# Patient Record
Sex: FEMALE | Race: BLACK OR AFRICAN AMERICAN | NOT HISPANIC OR LATINO | Employment: OTHER | URBAN - METROPOLITAN AREA
[De-identification: names, ages, dates, MRNs, and addresses within clinical notes are randomized per-mention and may not be internally consistent; named-entity substitution may affect disease eponyms.]

---

## 2017-04-05 ENCOUNTER — OFFICE VISIT (OUTPATIENT)
Dept: PODIATRY | Facility: CLINIC | Age: 72
End: 2017-04-05
Payer: MEDICARE

## 2017-04-05 VITALS
WEIGHT: 240 LBS | HEIGHT: 67 IN | DIASTOLIC BLOOD PRESSURE: 60 MMHG | BODY MASS INDEX: 37.67 KG/M2 | SYSTOLIC BLOOD PRESSURE: 110 MMHG

## 2017-04-05 DIAGNOSIS — B35.1 ONYCHOMYCOSIS DUE TO DERMATOPHYTE: ICD-10-CM

## 2017-04-05 DIAGNOSIS — L84 CORN OR CALLUS: ICD-10-CM

## 2017-04-05 DIAGNOSIS — E11.49 TYPE II DIABETES MELLITUS WITH NEUROLOGICAL MANIFESTATIONS: Primary | ICD-10-CM

## 2017-04-05 DIAGNOSIS — M20.10 HALLUX ABDUCTO VALGUS, UNSPECIFIED LATERALITY: ICD-10-CM

## 2017-04-05 PROCEDURE — 11721 DEBRIDE NAIL 6 OR MORE: CPT | Mod: Q9,PBBFAC,PO | Performed by: PODIATRIST

## 2017-04-05 PROCEDURE — 11721 DEBRIDE NAIL 6 OR MORE: CPT | Mod: 59,Q9,S$PBB, | Performed by: PODIATRIST

## 2017-04-05 PROCEDURE — 11056 PARNG/CUTG B9 HYPRKR LES 2-4: CPT | Mod: Q9,PBBFAC,PO | Performed by: PODIATRIST

## 2017-04-05 PROCEDURE — 99212 OFFICE O/P EST SF 10 MIN: CPT | Mod: PBBFAC,PO | Performed by: PODIATRIST

## 2017-04-05 PROCEDURE — 11056 PARNG/CUTG B9 HYPRKR LES 2-4: CPT | Mod: Q9,S$PBB,, | Performed by: PODIATRIST

## 2017-04-05 PROCEDURE — 99499 UNLISTED E&M SERVICE: CPT | Mod: S$PBB,,, | Performed by: PODIATRIST

## 2017-04-05 PROCEDURE — 99999 PR PBB SHADOW E&M-EST. PATIENT-LVL II: CPT | Mod: PBBFAC,,, | Performed by: PODIATRIST

## 2017-04-05 RX ORDER — ERGOCALCIFEROL 1.25 MG/1
50000 CAPSULE ORAL
Refills: 0 | COMMUNITY
Start: 2017-03-21

## 2017-04-05 NOTE — MR AVS SNAPSHOT
[facility]  [address] [facility]  [place] [address]  Phone: [phone]                  Evangelina Mallory   2017 3:15 PM   Office Visit    Description:  Female : 1945   Provider:  Virginia Goins DPM   Department:  [facility]           Reason for Visit     Diabetes Mellitus     Diabetic Foot Exam     Nail Care                To Do List           Future Appointments        Provider Department Dept Phone    2017 11:00 AM Virginia Goins DPM [facility] [phone]      Goals (5 Years of Data)     None      [facility]     [facility] Care [facility] -  Assistance  Unless otherwise directed by your provider, please contact [facility], our nurse care line that is available for  assistance.     Registered nurses in the [facility] provide: appointment scheduling, clinical advisement, health education, and other advisory services.  Call: [phone] (toll free)               Medications           Message regarding Medications     Verify the changes and/or additions to your medication regime listed below are the same as discussed with your clinician today.  If any of these changes or additions are incorrect, please notify your healthcare provider.        STOP taking these medications     ciclopirox (PENLAC) 8 % Soln Apply to affected toenails once per day x 3 months. Once per week use alcohol to remove excess product    ciprofloxacin HCl (CILOXAN) 0.3 % ophthalmic solution     doxycycline (VIBRA-TABS) 100 MG tablet     glyBURIDE (DIABETA) 5 MG tablet     ketorolac 0.4% (ACULAR) 0.4 % Drop     methylPREDNISolone (MEDROL DOSEPACK) 4 mg tablet     polyethylene glycol (GOLYTELY,NULYTELY) 236-22.74-6.74 gram suspension     prednisoLONE acetate (PRED FORTE) 1 % DrpS            Verify that the below list of medications is an accurate representation of the medications you are currently taking.  If none reported, the list may be blank. If incorrect,  "please contact your healthcare provider. Carry this list with you in case of emergency.           Current Medications     amitriptyline (ELAVIL) 25 MG tablet TK 1 T PO  AT BEDTIME    amlodipine (NORVASC) 5 MG tablet     CONTOUR NEXT STRIPS Strp     cyclobenzaprine (FLEXERIL) 5 MG tablet     ferrous sulfate 325 mg (65 mg iron) Tab tablet TK 1 T PO  BID    FLUZONE HIGH-DOSE 2016-17, PF, 180 mcg/0.5 mL Syrg ADM 0.5ML IM UTD    gabapentin (NEURONTIN) 800 MG tablet     glipiZIDE (GLUCOTROL) 5 MG tablet     LANTUS SOLOSTAR 100 unit/mL (3 mL) InPn pen     losartan (COZAAR) 25 MG tablet     LYRICA 75 mg capsule     meclizine (ANTIVERT) 25 mg tablet     MICROLET LANCET Misc     NOVOFINE 30 30 x 1/3 " Ndle     pantoprazole (PROTONIX) 40 MG tablet     pravastatin (PRAVACHOL) 40 MG tablet     PROAIR HFA 90 mcg/actuation inhaler     tramadol (ULTRAM) 50 mg tablet     VITAMIN D2 50,000 unit capsule Take 50,000 Units by mouth every 7 days.           Clinical Reference Information           Your Vitals Were     BP Height Weight BMI       110/60 5' 7" (1.702 m) 108.9 kg (240 lb) 37.59 kg/m2       Blood Pressure          Most Recent Value    BP  110/60      Allergies as of 4/5/2017     No Known Allergies      Immunizations Administered on Date of Encounter - 4/5/2017     None      [facility] Sign-Up     Activating your [facility] account is as easy as 1-2-3!     1) Visit [email or web address], select Sign Up Now, enter this activation code and your date of birth, then select Next.  [email or web address]  Expires: 5/20/2017  3:51 PM      2) Create a username and password to use when you visit [facility] in the future and select a security question in case you lose your password and select Next.    3) Enter your e-mail address and click Sign Up!    Additional Information  If you have questions, please e-mail [email or web address] or call [phone] to talk to our [facility] staff. Remember, [facility] is NOT to be used for urgent needs. For medical " emergencies, dial 911.         Language Assistance Services     ATTENTION: Language assistance services are available, free of charge. Please call [phone].      ATENCIÓN: Si habla terese, tiene a hilton disposición servicios gratuitos de asistencia lingüística. Llame al [phone].     CHÚ Ý: N?u b?n nói Ti?ng Vi?t, có các d?ch v? h? tr? ngôn ng? mi?n phí dành cho b?n. G?i s? [phone].         [facility] complies with applicable Federal civil rights laws and does not discriminate on the basis of race, color, national origin, age, disability, or sex.

## 2017-04-05 NOTE — PROGRESS NOTES
"Subjective:      Patient ID: Evangelina Mallory is a 71 y.o. female.    Chief Complaint: Diabetes Mellitus (pcp Dr. Lilly ); Diabetic Foot Exam; and Nail Care    Evangelina is a 71 y.o. female who presents to the clinic for evaluation and treatment of high risk feet. Evangelina has a past medical history of Diabetes mellitus, type 2. The patient's chief complaint is elongated, thickened toenails aggravated by increased weight bearing, shoe gear, pressure. This patient has documented high risk feet requiring routine maintenance secondary to diabetes mellitis and those secondary complications of diabetes, as mentioned.       PCP: Ashlee Lilly MD    Date Last Seen by PCP:   Chief Complaint   Patient presents with    Diabetes Mellitus     pcp Dr. Lilly     Diabetic Foot Exam    Nail Care     Current shoe gear: boat shoes    No results found for: HGBA1C  There are no active problems to display for this patient.    Current Outpatient Prescriptions on File Prior to Visit   Medication Sig Dispense Refill    amitriptyline (ELAVIL) 25 MG tablet TK 1 T PO  AT BEDTIME  1    amlodipine (NORVASC) 5 MG tablet   1    CONTOUR NEXT STRIPS Strp   1    cyclobenzaprine (FLEXERIL) 5 MG tablet       ferrous sulfate 325 mg (65 mg iron) Tab tablet TK 1 T PO  BID  3    FLUZONE HIGH-DOSE 2016-17, PF, 180 mcg/0.5 mL Syrg ADM 0.5ML IM UTD  0    gabapentin (NEURONTIN) 800 MG tablet       glipiZIDE (GLUCOTROL) 5 MG tablet       LANTUS SOLOSTAR 100 unit/mL (3 mL) InPn pen       losartan (COZAAR) 25 MG tablet   1    LYRICA 75 mg capsule       meclizine (ANTIVERT) 25 mg tablet   0    MICROLET LANCET Misc   1    NOVOFINE 30 30 x 1/3 " Ndle       pantoprazole (PROTONIX) 40 MG tablet       pravastatin (PRAVACHOL) 40 MG tablet       PROAIR HFA 90 mcg/actuation inhaler       tramadol (ULTRAM) 50 mg tablet       [DISCONTINUED] amlodipine (NORVASC) 10 MG tablet       [DISCONTINUED] ciclopirox (PENLAC) 8 % Soln Apply to affected toenails once per " day x 3 months. Once per week use alcohol to remove excess product 1 Bottle 3    [DISCONTINUED] ciprofloxacin HCl (CILOXAN) 0.3 % ophthalmic solution       [DISCONTINUED] doxycycline (VIBRA-TABS) 100 MG tablet       [DISCONTINUED] glyBURIDE (DIABETA) 5 MG tablet       [DISCONTINUED] ketorolac 0.4% (ACULAR) 0.4 % Drop       [DISCONTINUED] methylPREDNISolone (MEDROL DOSEPACK) 4 mg tablet       [DISCONTINUED] polyethylene glycol (GOLYTELY,NULYTELY) 236-22.74-6.74 gram suspension       [DISCONTINUED] prednisoLONE acetate (PRED FORTE) 1 % DrpS        No current facility-administered medications on file prior to visit.      Review of patient's allergies indicates:  No Known Allergies  Past Surgical History:   Procedure Laterality Date    HYSTERECTOMY       Family History   Problem Relation Age of Onset    Hypertension Mother     Diabetes Father      Social History     Social History    Marital status:      Spouse name: N/A    Number of children: N/A    Years of education: N/A     Occupational History    Not on file.     Social History Main Topics    Smoking status: Former Smoker    Smokeless tobacco: Not on file    Alcohol use Yes    Drug use: No    Sexual activity: Not on file     Other Topics Concern    Not on file     Social History Narrative     Review of Systems   Constitution: Negative for chills, fever and weakness.   Cardiovascular: Negative for claudication and leg swelling.   Respiratory: Negative for cough and shortness of breath.    Skin: Positive for dry skin and nail changes. Negative for itching and rash.   Musculoskeletal: Positive for arthritis and muscle cramps (nocturnal). Negative for falls, joint pain, joint swelling and muscle weakness.   Gastrointestinal: Negative for diarrhea, nausea and vomiting.   Neurological: Positive for numbness, paresthesias and sensory change. Negative for tremors.   Psychiatric/Behavioral: Negative for altered mental status and hallucinations.  "        Objective:       Vitals:    04/05/17 1526   BP: 110/60   Weight: 108.9 kg (240 lb)   Height: 5' 7" (1.702 m)   PainSc: 0-No pain       Physical Exam   Constitutional:   General: Pt. is well-developed, well-nourished, appears stated age, in no acute distress, alert and oriented x 3. No evidence of depression, anxiety, or agitation. Calm, cooperative, and communicative. Appropriate interactions and affect.       Cardiovascular:   Pulses:       Dorsalis pedis pulses are 1+ on the right side, and 1+ on the left side.        Posterior tibial pulses are 1+ on the right side, and 1+ on the left side.   Dorsalis pedis and posterior tibial pulses are diminished Bilaterally. Toes are cool to touch. Feet are warm proximally.There is decreased digital hair. Skin is atrophic     Musculoskeletal:        Right ankle: She exhibits normal range of motion and no swelling. No tenderness. Achilles tendon exhibits no pain and no defect.        Left ankle: She exhibits normal range of motion and no swelling. No tenderness. Achilles tendon exhibits no pain and no defect.        Right foot: There is normal range of motion and no tenderness.        Left foot: There is normal range of motion and no tenderness.   Decreased stride, station of gait.  apropulsive toe off.  Increased angle and base of gait.    Patient has hammertoes of digits 2-5 bilateral partially reducible without symptom today.    Visible and palpable bunion without pain at dorsomedial 1st metatarsal head right and left.  Hallux abducted right and left partially reducible, tracks laterally without being track bound.  No ecchymosis, erythema, edema, or cardinal signs infection or signs of trauma same foot.     Neurological: A sensory deficit is present.   Sacaton-Sonu 5.07 monofilamant testing is diminished Yadiel feet. Sharp/dull sensation diminished Bilaterally. Light touch diminished Bilaterally.   Skin: Skin is warm, dry and intact. No abrasion, no ecchymosis, no " lesion and no rash noted. She is not diaphoretic. No cyanosis or erythema. No pallor. Nails show no clubbing.   Skin is atrophic.      Diminished pedal hair noted    Toenails 1-5 bilaterally are elongated by 2-3 mm, thickened by 2-3 mm, discolored/yellowed, dystrophic, brittle with subungual debris.      Interdigital Spaces clean, dry and without evidence of break in skin integrity    Hyperkeratosis noted to distal tip of b/l 2nd digit adjacent to nail borders.    Psychiatric: She has a normal mood and affect. Her speech is normal.   Nursing note and vitals reviewed.        Assessment:       Encounter Diagnoses   Name Primary?    Type II diabetes mellitus with neurological manifestations Yes    Onychomycosis due to dermatophyte     Corn or callus     Hallux abducto valgus, unspecified laterality          Plan:       Evangelina was seen today for diabetes mellitus, diabetic foot exam and nail care.    Diagnoses and all orders for this visit:    Type II diabetes mellitus with neurological manifestations    Onychomycosis due to dermatophyte    Corn or callus    Hallux abducto valgus, unspecified laterality    I counseled the patient on her conditions, their implications and medical management.    - Shoe inspection. Diabetic Foot Education. Patient reminded of the importance of good nutrition and blood sugar control to help prevent podiatric complications of diabetes. Patient instructed on proper foot hygeine. We discussed wearing proper shoe gear, daily foot inspections, never walking without protective shoe gear, never putting sharp instruments to feet.     - With patient's permission, nails were aggressively reduced and debrided x 10 to their soft tissue attachment mechanically and with electric , removing all offending nail and debris. Patient relates relief following the procedure. She will continue to monitor the areas daily, inspect her feet, wear protective shoe gear when ambulatory, moisturizer to  maintain skin integrity and follow in this office in approximately 3-4 months, sooner p.r.n.    - b/l distal tip of 2nd digit corns/calluses trimmed with sterile #15 blade. Tolerated well and related relief following.     RTC 3 months, sooner PRN

## 2017-07-05 ENCOUNTER — OFFICE VISIT (OUTPATIENT)
Dept: PODIATRY | Facility: CLINIC | Age: 72
End: 2017-07-05
Payer: MEDICARE

## 2017-07-05 VITALS
BODY MASS INDEX: 37.67 KG/M2 | SYSTOLIC BLOOD PRESSURE: 120 MMHG | HEIGHT: 67 IN | DIASTOLIC BLOOD PRESSURE: 60 MMHG | WEIGHT: 240 LBS

## 2017-07-05 DIAGNOSIS — E11.49 TYPE II DIABETES MELLITUS WITH NEUROLOGICAL MANIFESTATIONS: Primary | ICD-10-CM

## 2017-07-05 DIAGNOSIS — M20.10 HALLUX ABDUCTO VALGUS, UNSPECIFIED LATERALITY: ICD-10-CM

## 2017-07-05 DIAGNOSIS — B35.1 ONYCHOMYCOSIS DUE TO DERMATOPHYTE: ICD-10-CM

## 2017-07-05 DIAGNOSIS — R20.2 PARESTHESIA: ICD-10-CM

## 2017-07-05 PROCEDURE — 11721 DEBRIDE NAIL 6 OR MORE: CPT | Mod: Q9,PBBFAC,PO | Performed by: PODIATRIST

## 2017-07-05 PROCEDURE — 99499 UNLISTED E&M SERVICE: CPT | Mod: S$PBB,,, | Performed by: PODIATRIST

## 2017-07-05 PROCEDURE — 11721 DEBRIDE NAIL 6 OR MORE: CPT | Mod: Q9,S$PBB,, | Performed by: PODIATRIST

## 2017-07-05 PROCEDURE — 99213 OFFICE O/P EST LOW 20 MIN: CPT | Mod: PBBFAC,PO | Performed by: PODIATRIST

## 2017-07-05 PROCEDURE — 99999 PR PBB SHADOW E&M-EST. PATIENT-LVL III: CPT | Mod: PBBFAC,,, | Performed by: PODIATRIST

## 2017-07-05 RX ORDER — INSULIN ASPART 100 [IU]/ML
10 INJECTION, SOLUTION INTRAVENOUS; SUBCUTANEOUS 3 TIMES DAILY
COMMUNITY
Start: 2017-04-18

## 2017-07-05 NOTE — PATIENT INSTRUCTIONS
Recommend lotions: eucerin, aquaphor, A&D ointment, gold bond for diabetics, sween    Shoe recommendations: (try 6pm.com, zappos.com , nordstromrack.com, or shoes.com for discounted prices) you can visit DSW shoes in [place] as well    Asics (GT 1000 or gel foundations), new balance, saucony (stabil c3),  Atkinson (transcend), vionic, propet (tennis shoe)    soft brand, clarks, crocs, aerosoles, naturalizers, SAS, ecco, marie, orlando castro, rockports (dress shoes)    Vionic, volitiles, burkenstocks, fitflops, propet (sandals)    Nike comfort thong sandals, crocs (house shoes)    Nail Home remedy:  Vicks Vapor rub OR Listerine and apple cider vinegar in a spray bottle to nails    Occasional soaks for 15-20 mins in luke warm water with 1 cup of listerine and 1 cup of apple cider vinegar are ok You may add several drops of oil of oregano or tea tree oil as well      Diabetes: Inspecting Your Feet  Diabetes increases your chances of developing foot problems. So inspect your feet every day. This helps you find small skin irritations before they become serious infections. If you have trouble seeing the bottoms of your feet, use a mirror or ask a family member or friend to help.     Pressure spots on the bottom of the foot are common areas where problems develop.   How to check your feet  Below are tips to help you look for foot problems. Try to check your feet at the same time each day, such as when you get out of bed in the morning:  · Check the top of each foot. The tops of toes, back of the heel, and outer edge of the foot can get a lot of rubbing from poor-fitting shoes.  · Check the bottom of each foot. Daily wear and tear often leads to problems at pressure spots.  · Check the toes and nails. Fungal infections often occur between toes. Toenail problems can also be a sign of fungal infections or lead to breaks in the skin.  · Check your shoes, too. Loose objects inside a shoe can injure the foot. Use your hand to feel  "inside your shoes for things like isabela, loose stitching, or rough areas that could irritate your skin.  Warning signs  Look for any color changes in the foot. Redness with streaks can signal a severe infection, which needs immediate medical attention. Tell your doctor right away if you have any of these problems:  · Swelling, sometimes with color changes, may be a sign of poor blood flow or infection. Symptoms include tenderness and an increase in the size of your foot.  · Warm or hot areas on your feet may be signs of infection. A foot that is cold may not be getting enough blood.  · Sensations such as burning, tingling, or pins and needles can be signs of a problem. Also check for areas that may be numb.  · Hot spots are caused by friction or pressure. Look for hot spots in areas that get a lot of rubbing. Hot spots can turn into blisters, calluses, or sores.  · Cracks and sores are caused by dry or irritated skin. They are a sign that the skin is breaking down, which can lead to infection.  · Toenail problems to watch for include nails growing into the skin (ingrown toenail) and causing redness or pain. Thick, yellow, or discolored nails can signal a fungal infection.  · Drainage and odor can develop from untreated sores and ulcers. Call your doctor right away if you notice white or yellow drainage, bleeding, or unpleasant odor.   © 8889-3161 [employer]. [address] [place] [id number]. All rights reserved. This information is not intended as a substitute for professional medical care. Always follow your healthcare professional's instructions.      \  Paraesthesias  Paraesthesia is a burning or prickling sensation that is sometimes felt in the hands, arms, legs or feet. It can also occur in other parts of the body. It can also feel like tingling or numbness, skin crawling, or itching. The feeling is not comfortable, but it is not painful. (The "pins and needles" feeling that happens " "when a foot or hand "falls asleep" is a temporary paraesthesia.)  Paraesthesias that last or come and go may be caused by medical issues that need to be treated. These include stroke, a bulging disk pressing on a nerve, a trapped nerve, vitamin deficiencies, or even certain medicines.  Tests are often done. These tests may include blood tests, X-ray, CT (computerized tomography) scan, or a muscle test (electromyography). Depending on the cause, treatment may include physical therapy.  Home care  · Tell the healthcare provider about all medicines you take. This includes prescription and over-the-counter medicines, vitamins, and herbs. Ask if any of the medicines may be causing your problems. Do not make any changes to prescription medicines without talking to your healthcare provider first.  · You may be prescribed medicines to help relieve the tingling feeling or for pain. Take all medicines as directed.  · A numb hand or foot may be more prone to injury. To help protect it:  ¨ Always use oven mitts.  ¨ Test water with an unaffected hand or foot.  ¨ Use caution when trimming nails. File sharp areas.  ¨ Wear shoes that fit well to avoid pressure points, blisters, and ulcers.  ¨ Inspect your hands and feet carefully (including the soles of your feet and between your toes) at least once a week. If you see red areas, sores, or other problems, tell your healthcare provider.  Follow-up care  Follow up with your doctor or as advised by our staff. You may need further testing or evaluation.  When to seek medical advice  Call your healthcare provider right away if any of the following occur:  · Numbness or weakness of the face, one arm, or one leg  · Slurred speech, confusion, trouble speaking, walking, or seeing  · Severe headache, fainting spell, dizziness, or seizure  · Chest, arm, neck, or upper back pain  · Loss of bladder or bowel control  · Open wound with redness, swelling, or pus  Date Last Reviewed: 9/25/2015  © " 3683-8595 [employer] [address] [place] [address], [place] [address]. All rights reserved. This information is not intended as a substitute for professional medical care. Always follow your healthcare professional's instructions.

## 2017-07-05 NOTE — PROGRESS NOTES
"Subjective:      Patient ID: Evangelina Mallory is a 71 y.o. female.    Chief Complaint: Diabetes Mellitus (pcp boy / lov 6-27-17, [facility] ); Diabetic Foot Exam; and Nail Care    Evangelina is a 71 y.o. female who presents to the clinic for evaluation and treatment of high risk feet. Evangelina has a past medical history of Diabetes mellitus, type 2. The patient's chief complaint is elongated, thickened toenails aggravated by increased weight bearing, shoe gear, pressure. This patient has documented high risk feet requiring routine maintenance secondary to diabetes mellitis and those secondary complications of diabetes, as mentioned.       PCP: Ashlee Lilly MD    Date Last Seen by PCP:   Chief Complaint   Patient presents with    Diabetes Mellitus     pcp boy / lov 6-27-17, [facility]     Diabetic Foot Exam    Nail Care     Current shoe gear: boat shoes    No results found for: HGBA1C  There are no active problems to display for this patient.    Current Outpatient Prescriptions on File Prior to Visit   Medication Sig Dispense Refill    amlodipine (NORVASC) 5 MG tablet   1    CONTOUR NEXT STRIPS Strp   1    cyclobenzaprine (FLEXERIL) 5 MG tablet       ferrous sulfate 325 mg (65 mg iron) Tab tablet TK 1 T PO  BID  3    gabapentin (NEURONTIN) 800 MG tablet       glipiZIDE (GLUCOTROL) 5 MG tablet       LANTUS SOLOSTAR 100 unit/mL (3 mL) InPn pen       losartan (COZAAR) 25 MG tablet   1    LYRICA 75 mg capsule       meclizine (ANTIVERT) 25 mg tablet   0    MICROLET LANCET Misc   1    NOVOFINE 30 30 x 1/3 " Ndle       pantoprazole (PROTONIX) 40 MG tablet       pravastatin (PRAVACHOL) 40 MG tablet       PROAIR HFA 90 mcg/actuation inhaler       tramadol (ULTRAM) 50 mg tablet       VITAMIN D2 50,000 unit capsule Take 50,000 Units by mouth every 7 days.  0     No current facility-administered medications on file prior to visit.      Review of patient's allergies indicates:  No Known " "Allergies  Past Surgical History:   Procedure Laterality Date    HYSTERECTOMY       Family History   Problem Relation Age of Onset    Hypertension Mother     Diabetes Father      Social History     Social History    Marital status:      Spouse name: N/A    Number of children: N/A    Years of education: N/A     Occupational History    Not on file.     Social History Main Topics    Smoking status: Former Smoker    Smokeless tobacco: Not on file    Alcohol use Yes    Drug use: No    Sexual activity: Not on file     Other Topics Concern    Not on file     Social History Narrative    No narrative on file     Review of Systems   Constitution: Positive for weight loss (intentional, 5 lbs). Negative for chills, fever and weakness.   Cardiovascular: Negative for claudication and leg swelling.   Respiratory: Negative for cough and shortness of breath.    Skin: Positive for dry skin and nail changes. Negative for itching and rash.   Musculoskeletal: Positive for arthritis and muscle cramps (nocturnal). Negative for falls, joint pain, joint swelling and muscle weakness.   Gastrointestinal: Negative for diarrhea, nausea and vomiting.   Neurological: Positive for numbness, paresthesias and sensory change. Negative for tremors.   Psychiatric/Behavioral: Negative for altered mental status and hallucinations.         Objective:       Vitals:    07/05/17 1127   BP: 120/60   Weight: 108.9 kg (240 lb)   Height: 5' 7" (1.702 m)   PainSc: 0-No pain       Physical Exam   Constitutional:   General: Pt. is well-developed, well-nourished, appears stated age, in no acute distress, alert and oriented x 3. No evidence of depression, anxiety, or agitation. Calm, cooperative, and communicative. Appropriate interactions and affect.       Cardiovascular:   Pulses:       Dorsalis pedis pulses are 1+ on the right side, and 1+ on the left side.        Posterior tibial pulses are 1+ on the right side, and 1+ on the left side. "   Dorsalis pedis and posterior tibial pulses are diminished Bilaterally. Toes are cool to touch. Feet are warm proximally.There is decreased digital hair. Skin is atrophic     Musculoskeletal:        Right ankle: She exhibits normal range of motion and no swelling. No tenderness. Achilles tendon exhibits no pain and no defect.        Left ankle: She exhibits normal range of motion and no swelling. No tenderness. Achilles tendon exhibits no pain and no defect.        Right foot: There is normal range of motion and no tenderness.        Left foot: There is normal range of motion and no tenderness.   Decreased stride, station of gait.  apropulsive toe off.  Increased angle and base of gait.    Patient has hammertoes of digits 2-5 bilateral partially reducible without symptom today.    Visible and palpable bunion without pain at dorsomedial 1st metatarsal head right and left.  Hallux abducted right and left partially reducible, tracks laterally without being track bound.  No ecchymosis, erythema, edema, or cardinal signs infection or signs of trauma same foot.     Neurological: A sensory deficit is present.   [place]-Sonu 5.07 monofilamant testing is diminished Yadiel feet. Sharp/dull sensation diminished Bilaterally. Light touch diminished Bilaterally.   Skin: Skin is warm, dry and intact. No abrasion, no ecchymosis, no lesion and no rash noted. She is not diaphoretic. No cyanosis or erythema. No pallor. Nails show no clubbing.   Skin is atrophic.      Diminished pedal hair noted    Toenails 1-5 bilaterally are elongated by 2-3 mm, thickened by 2-3 mm, discolored/yellowed, dystrophic, brittle with subungual debris.      Interdigital Spaces clean, dry and without evidence of break in skin integrity     Psychiatric: She has a normal mood and affect. Her speech is normal.   Nursing note and vitals reviewed.        Assessment:       Encounter Diagnoses   Name Primary?    Type II diabetes mellitus with neurological  manifestations Yes    Onychomycosis due to dermatophyte     Hallux abducto valgus, unspecified laterality     Paresthesia          Plan:       Evangelina was seen today for diabetes mellitus, diabetic foot exam and nail care.    Diagnoses and all orders for this visit:    Type II diabetes mellitus with neurological manifestations    Onychomycosis due to dermatophyte    Hallux abducto valgus, unspecified laterality    Paresthesia      I counseled the patient on her conditions, their implications and medical management.    - Shoe inspection. Diabetic Foot Education. Patient reminded of the importance of good nutrition and blood sugar control to help prevent podiatric complications of diabetes. Patient instructed on proper foot hygeine. We discussed wearing proper shoe gear, daily foot inspections, never walking without protective shoe gear, never putting sharp instruments to feet.     - With patient's permission, nails were aggressively reduced and debrided x 10 to their soft tissue attachment mechanically and with electric , removing all offending nail and debris. Patient relates relief following the procedure. She will continue to monitor the areas daily, inspect her feet, wear protective shoe gear when ambulatory, moisturizer to maintain skin integrity and follow in this office in approximately 3-4 months, sooner p.r.n.

## 2017-10-11 ENCOUNTER — OFFICE VISIT (OUTPATIENT)
Dept: PODIATRY | Facility: CLINIC | Age: 72
End: 2017-10-11
Payer: MEDICARE

## 2017-10-11 VITALS
BODY MASS INDEX: 37.67 KG/M2 | SYSTOLIC BLOOD PRESSURE: 114 MMHG | HEIGHT: 67 IN | WEIGHT: 240 LBS | DIASTOLIC BLOOD PRESSURE: 50 MMHG

## 2017-10-11 DIAGNOSIS — M20.10 HALLUX ABDUCTO VALGUS, UNSPECIFIED LATERALITY: ICD-10-CM

## 2017-10-11 DIAGNOSIS — E11.49 TYPE II DIABETES MELLITUS WITH NEUROLOGICAL MANIFESTATIONS: Primary | ICD-10-CM

## 2017-10-11 DIAGNOSIS — B35.1 ONYCHOMYCOSIS DUE TO DERMATOPHYTE: ICD-10-CM

## 2017-10-11 PROCEDURE — 99499 UNLISTED E&M SERVICE: CPT | Mod: S$PBB,,, | Performed by: PODIATRIST

## 2017-10-11 PROCEDURE — 99999 PR PBB SHADOW E&M-EST. PATIENT-LVL III: CPT | Mod: PBBFAC,,, | Performed by: PODIATRIST

## 2017-10-11 PROCEDURE — 99213 OFFICE O/P EST LOW 20 MIN: CPT | Mod: PBBFAC,PO | Performed by: PODIATRIST

## 2017-10-11 PROCEDURE — 11721 DEBRIDE NAIL 6 OR MORE: CPT | Mod: Q9,S$PBB,, | Performed by: PODIATRIST

## 2017-10-11 PROCEDURE — 11721 DEBRIDE NAIL 6 OR MORE: CPT | Mod: Q9,PBBFAC,PO | Performed by: PODIATRIST

## 2017-10-11 RX ORDER — PEN NEEDLE, DIABETIC 29 G X1/2"
NEEDLE, DISPOSABLE MISCELLANEOUS
COMMUNITY
Start: 2017-10-09

## 2017-10-11 RX ORDER — PNEUMOCOCCAL 13-VALENT CONJUGATE VACCINE 2.2; 2.2; 2.2; 2.2; 2.2; 4.4; 2.2; 2.2; 2.2; 2.2; 2.2; 2.2; 2.2 UG/.5ML; UG/.5ML; UG/.5ML; UG/.5ML; UG/.5ML; UG/.5ML; UG/.5ML; UG/.5ML; UG/.5ML; UG/.5ML; UG/.5ML; UG/.5ML; UG/.5ML
INJECTION, SUSPENSION INTRAMUSCULAR
Refills: 0 | COMMUNITY
Start: 2017-09-27

## 2017-10-11 RX ORDER — INSULIN DETEMIR 100 [IU]/ML
33 INJECTION, SOLUTION SUBCUTANEOUS NIGHTLY
COMMUNITY
Start: 2017-09-06

## 2017-10-11 RX ORDER — INSULIN ASPART 100 [IU]/ML
INJECTION, SOLUTION INTRAVENOUS; SUBCUTANEOUS
COMMUNITY
Start: 2017-10-05 | End: 2018-08-02 | Stop reason: ALTCHOICE

## 2017-10-11 NOTE — PATIENT INSTRUCTIONS
Recommend lotions: eucerin, aquaphor, A&D ointment, gold bond for diabetics, sween; urea 40 with aloe (found on amazon.com)    Shoe recommendations: (try 6pm.com, zappos.com , nordstromrack.com, or shoes.com for discounted prices) you can visit DSW shoes in [place] as well    Asics (GT 2000 or gel foundations), new balance, saucony (stabil c3),  Atkinson (GTS or Beast or transcend), vionic, propet (tennis shoe)    sofft brand, clarks, crocs, aerosoles, naturalizers, SAS, ecco, marie, orlando castro, rockports (dress shoes)    Vionic, burkenstocks, fitflops, propet (sandals)    Nike comfort thong sandals, crocs, propet (house shoes)    Nail Home remedy:  Vicks Vapor rub to nails for easier managability    Occasional soaks for 15-20 mins in luke warm water with 1 cup of listerine and 1 cup of apple cider vinegar are ok You may add several drops of oil of oregano or tea tree oil as well        Diabetes: Inspecting Your Feet  Diabetes increases your chances of developing foot problems. So inspect your feet every day. This helps you find small skin irritations before they become serious infections. If you have trouble seeing the bottoms of your feet, use a mirror or ask a family member or friend to help.     Pressure spots on the bottom of the foot are common areas where problems develop.   How to check your feet  Below are tips to help you look for foot problems. Try to check your feet at the same time each day, such as when you get out of bed in the morning:  · Check the top of each foot. The tops of toes, back of the heel, and outer edge of the foot can get a lot of rubbing from poor-fitting shoes.  · Check the bottom of each foot. Daily wear and tear often leads to problems at pressure spots.  · Check the toes and nails. Fungal infections often occur between toes. Toenail problems can also be a sign of fungal infections or lead to breaks in the skin.  · Check your shoes, too. Loose objects inside a shoe can injure the foot.  Use your hand to feel inside your shoes for things like isabela, loose stitching, or rough areas that could irritate your skin.  Warning signs  Look for any color changes in the foot. Redness with streaks can signal a severe infection, which needs immediate medical attention. Tell your doctor right away if you have any of these problems:  · Swelling, sometimes with color changes, may be a sign of poor blood flow or infection. Symptoms include tenderness and an increase in the size of your foot.  · Warm or hot areas on your feet may be signs of infection. A foot that is cold may not be getting enough blood.  · Sensations such as burning, tingling, or pins and needles can be signs of a problem. Also check for areas that may be numb.  · Hot spots are caused by friction or pressure. Look for hot spots in areas that get a lot of rubbing. Hot spots can turn into blisters, calluses, or sores.  · Cracks and sores are caused by dry or irritated skin. They are a sign that the skin is breaking down, which can lead to infection.  · Toenail problems to watch for include nails growing into the skin (ingrown toenail) and causing redness or pain. Thick, yellow, or discolored nails can signal a fungal infection.  · Drainage and odor can develop from untreated sores and ulcers. Call your doctor right away if you notice white or yellow drainage, bleeding, or unpleasant odor.   © 4178-7370 [employer]. [address] [place] [address], [place] [address]. All rights reserved. This information is not intended as a substitute for professional medical care. Always follow your healthcare professional's instructions.

## 2017-10-12 NOTE — PROGRESS NOTES
"Subjective:      Patient ID: Evangelina Mallory is a 71 y.o. female.    Chief Complaint: Diabetes Mellitus (pcp Dr. Lilly 9-20-17); Diabetic Foot Exam; and Nail Care    Evangelina is a 71 y.o. female who presents to the clinic for evaluation and treatment of high risk feet. Evangelina has a past medical history of Diabetes mellitus, type 2. The patient's chief complaint is elongated, thickened toenails aggravated by increased weight bearing, shoe gear, pressure. This patient has documented high risk feet requiring routine maintenance secondary to diabetes mellitis and those secondary complications of diabetes, as mentioned.       PCP: Ashlee Lilly MD    Date Last Seen by PCP:   Chief Complaint   Patient presents with    Diabetes Mellitus     pcp Dr. Lilly 9-20-17    Diabetic Foot Exam    Nail Care     Current shoe gear: casual shoes    No results found for: HGBA1C  There are no active problems to display for this patient.    Current Outpatient Prescriptions on File Prior to Visit   Medication Sig Dispense Refill    amlodipine (NORVASC) 5 MG tablet   1    CONTOUR NEXT STRIPS Strp   1    cyclobenzaprine (FLEXERIL) 5 MG tablet       ferrous sulfate 325 mg (65 mg iron) Tab tablet TK 1 T PO  BID  3    gabapentin (NEURONTIN) 800 MG tablet       glipiZIDE (GLUCOTROL) 5 MG tablet       LANTUS SOLOSTAR 100 unit/mL (3 mL) InPn pen       losartan (COZAAR) 25 MG tablet   1    LYRICA 75 mg capsule       meclizine (ANTIVERT) 25 mg tablet   0    MICROLET LANCET Misc   1    NOVOFINE 30 30 x 1/3 " Ndle       NOVOLOG FLEXPEN 100 unit/mL InPn pen Inject 5 Units into the skin 3 (three) times daily.      pantoprazole (PROTONIX) 40 MG tablet       pravastatin (PRAVACHOL) 40 MG tablet       PROAIR HFA 90 mcg/actuation inhaler       tramadol (ULTRAM) 50 mg tablet       VITAMIN D2 50,000 unit capsule Take 50,000 Units by mouth every 7 days.  0     No current facility-administered medications on file prior to visit.      Review of " "patient's allergies indicates:  No Known Allergies  Past Surgical History:   Procedure Laterality Date    HYSTERECTOMY       Family History   Problem Relation Age of Onset    Hypertension Mother     Diabetes Father      Social History     Social History    Marital status:      Spouse name: N/A    Number of children: N/A    Years of education: N/A     Occupational History    Not on file.     Social History Main Topics    Smoking status: Former Smoker    Smokeless tobacco: Former User    Alcohol use Yes    Drug use: No    Sexual activity: Not on file     Other Topics Concern    Not on file     Social History Narrative    No narrative on file     Review of Systems   Constitution: Positive for weight loss (intentional). Negative for chills, fever and weakness.   Cardiovascular: Negative for claudication and leg swelling.   Respiratory: Negative for cough and shortness of breath.    Skin: Positive for dry skin and nail changes. Negative for itching and rash.   Musculoskeletal: Positive for arthritis and muscle cramps (nocturnal). Negative for falls, joint pain, joint swelling and muscle weakness.   Gastrointestinal: Negative for diarrhea, nausea and vomiting.   Neurological: Positive for numbness, paresthesias and sensory change. Negative for tremors.   Psychiatric/Behavioral: Negative for altered mental status and hallucinations.         Objective:       Vitals:    10/11/17 1146   BP: (!) 114/50   Weight: 108.9 kg (240 lb)   Height: 5' 7" (1.702 m)   PainSc: 0-No pain       Physical Exam   Constitutional:   General: Pt. is well-developed, well-nourished, appears stated age, in no acute distress, alert and oriented x 3. No evidence of depression, anxiety, or agitation. Calm, cooperative, and communicative. Appropriate interactions and affect.       Cardiovascular:   Pulses:       Dorsalis pedis pulses are 1+ on the right side, and 1+ on the left side.        Posterior tibial pulses are 1+ on the " right side, and 1+ on the left side.   Dorsalis pedis and posterior tibial pulses are diminished Bilaterally. Toes are cool to touch. Feet are warm proximally.There is decreased digital hair. Skin is atrophic     Musculoskeletal:        Right ankle: She exhibits normal range of motion and no swelling. No tenderness. Achilles tendon exhibits no pain and no defect.        Left ankle: She exhibits normal range of motion and no swelling. No tenderness. Achilles tendon exhibits no pain and no defect.        Right foot: There is normal range of motion and no tenderness.        Left foot: There is normal range of motion and no tenderness.   Decreased stride, station of gait.  apropulsive toe off.  Increased angle and base of gait.    Patient has hammertoes of digits 2-5 bilateral partially reducible without symptom today.    Visible and palpable bunion without pain at dorsomedial 1st metatarsal head right and left.  Hallux abducted right and left partially reducible, tracks laterally without being track bound.  No ecchymosis, erythema, edema, or cardinal signs infection or signs of trauma same foot.     Neurological: A sensory deficit is present.   [place]-Sonu 5.07 monofilamant testing is diminished Yadiel feet. Sharp/dull sensation diminished Bilaterally. Light touch diminished Bilaterally.   Skin: Skin is warm, dry and intact. No abrasion, no ecchymosis, no lesion and no rash noted. She is not diaphoretic. No cyanosis or erythema. No pallor. Nails show no clubbing.   Skin is atrophic.      Diminished pedal hair noted    Toenails 1-5 bilaterally are elongated by 2-3 mm, thickened by 2-3 mm, discolored/yellowed, dystrophic, brittle with subungual debris.      Interdigital Spaces clean, dry and without evidence of break in skin integrity     Psychiatric: She has a normal mood and affect. Her speech is normal.   Nursing note and vitals reviewed.        Assessment:       Encounter Diagnoses   Name Primary?    Type II  diabetes mellitus with neurological manifestations Yes    Hallux abducto valgus, unspecified laterality     Onychomycosis due to dermatophyte          Plan:       Evangelina was seen today for diabetes mellitus, diabetic foot exam and nail care.    Diagnoses and all orders for this visit:    Type II diabetes mellitus with neurological manifestations    Hallux abducto valgus, unspecified laterality    Onychomycosis due to dermatophyte      I counseled the patient on her conditions, their implications and medical management.    - Shoe inspection. Diabetic Foot Education. Patient reminded of the importance of good nutrition and blood sugar control to help prevent podiatric complications of diabetes. Patient instructed on proper foot hygeine. We discussed wearing proper shoe gear, daily foot inspections, never walking without protective shoe gear, never putting sharp instruments to feet.     - With patient's permission, nails were aggressively reduced and debrided x 10 to their soft tissue attachment mechanically and with electric , removing all offending nail and debris. Patient relates relief following the procedure. She will continue to monitor the areas daily, inspect her feet, wear protective shoe gear when ambulatory, moisturizer to maintain skin integrity and follow in this office in approximately 3-4 months, sooner p.r.n.

## 2018-01-31 ENCOUNTER — OFFICE VISIT (OUTPATIENT)
Dept: PODIATRY | Facility: CLINIC | Age: 73
End: 2018-01-31
Payer: MEDICARE

## 2018-01-31 VITALS
WEIGHT: 240 LBS | SYSTOLIC BLOOD PRESSURE: 140 MMHG | HEIGHT: 67 IN | BODY MASS INDEX: 37.67 KG/M2 | DIASTOLIC BLOOD PRESSURE: 60 MMHG

## 2018-01-31 DIAGNOSIS — E11.49 TYPE II DIABETES MELLITUS WITH NEUROLOGICAL MANIFESTATIONS: Primary | ICD-10-CM

## 2018-01-31 DIAGNOSIS — B35.1 ONYCHOMYCOSIS DUE TO DERMATOPHYTE: ICD-10-CM

## 2018-01-31 PROCEDURE — 11721 DEBRIDE NAIL 6 OR MORE: CPT | Mod: Q9,S$GLB,, | Performed by: PODIATRIST

## 2018-01-31 PROCEDURE — 99999 PR PBB SHADOW E&M-EST. PATIENT-LVL III: CPT | Mod: PBBFAC,,, | Performed by: PODIATRIST

## 2018-01-31 PROCEDURE — 99499 UNLISTED E&M SERVICE: CPT | Mod: S$GLB,,, | Performed by: PODIATRIST

## 2018-01-31 NOTE — PATIENT INSTRUCTIONS
Recommend lotions: eucerin, eucerin for diabetics, aquaphor, A&D ointment, gold bond for diabetics, sween, [place] all purpose baby ointment,  urea 40 with aloe (found on amazon.com)    Shoe recommendations: (try 6pm.com, zappos.com , nordstromrack.[employer], or shoes.[employer] for discounted prices) you can visit DSW shoes in [place]  or [employer] [facility] in the [facility] (there are also several shoe brand outlets in the [facility])    Asics (GT 2000 or gel foundations), new balance stability type shoes, saucony (stabil c3),  Atkinson (GTS or Beast or transcend), vionic, propet (tennis shoe)    sofft brand, clarks, crocs, aerosoles, naturalizers, SAS, ecco, born, orlando castro, rockports (dress shoes)    Vionic, burkenstocks, fitflops, propet (sandals)  Nike comfort thong sandals, crocs, propet (house shoes)    Nail Home remedy:  Vicks Vapor rub to nails for easier managability    Occasional soaks for 15-20 mins in luke warm water with 1 cup of listerine and 1 cup of apple cider vinegar are ok You may add several drops of oil of oregano or tea tree oil as well        Diabetes: Inspecting Your Feet  Diabetes increases your chances of developing foot problems. So inspect your feet every day. This helps you find small skin irritations before they become serious infections. If you have trouble seeing the bottoms of your feet, use a mirror or ask a family member or friend to help.     Pressure spots on the bottom of the foot are common areas where problems develop.   How to check your feet  Below are tips to help you look for foot problems. Try to check your feet at the same time each day, such as when you get out of bed in the morning:  · Check the top of each foot. The tops of toes, back of the heel, and outer edge of the foot can get a lot of rubbing from poor-fitting shoes.  · Check the bottom of each foot. Daily wear and tear often leads to problems at pressure spots.  · Check the toes and nails. Fungal infections often occur  between toes. Toenail problems can also be a sign of fungal infections or lead to breaks in the skin.  · Check your shoes, too. Loose objects inside a shoe can injure the foot. Use your hand to feel inside your shoes for things like isabela, loose stitching, or rough areas that could irritate your skin.  Warning signs  Look for any color changes in the foot. Redness with streaks can signal a severe infection, which needs immediate medical attention. Tell your doctor right away if you have any of these problems:  · Swelling, sometimes with color changes, may be a sign of poor blood flow or infection. Symptoms include tenderness and an increase in the size of your foot.  · Warm or hot areas on your feet may be signs of infection. A foot that is cold may not be getting enough blood.  · Sensations such as burning, tingling, or pins and needles can be signs of a problem. Also check for areas that may be numb.  · Hot spots are caused by friction or pressure. Look for hot spots in areas that get a lot of rubbing. Hot spots can turn into blisters, calluses, or sores.  · Cracks and sores are caused by dry or irritated skin. They are a sign that the skin is breaking down, which can lead to infection.  · Toenail problems to watch for include nails growing into the skin (ingrown toenail) and causing redness or pain. Thick, yellow, or discolored nails can signal a fungal infection.  · Drainage and odor can develop from untreated sores and ulcers. Call your doctor right away if you notice white or yellow drainage, bleeding, or unpleasant odor.   © 2707-0439 [employer]. [address] [place] [address]. All rights reserved. This information is not intended as a substitute for professional medical care. Always follow your healthcare professional's instructions.        Step-by-Step:  Inspecting Your Feet (Diabetes)    Date Last Reviewed: 10/1/2016  © 9954-6780 [employer]. [address] [place] [address]  Road, [place] [address]. All rights reserved. This information is not intended as a substitute for professional medical care. Always follow your healthcare professional's instructions.

## 2018-02-02 NOTE — PROGRESS NOTES
"Subjective:      Patient ID: Evangelina Mallory is a 72 y.o. female.    Chief Complaint: Diabetes Mellitus (pcp Dr. Lilly 9-20-17); Diabetic Foot Exam; and Nail Care    Evangelina is a 72 y.o. female who presents to the clinic for evaluation and treatment of high risk feet. Evangelina has a past medical history of Diabetes mellitus, type 2. The patient's chief complaint is elongated, thickened toenails aggravated by increased weight bearing, shoe gear, pressure. This patient has documented high risk feet requiring routine maintenance secondary to diabetes mellitis and those secondary complications of diabetes, as mentioned.       PCP: Ashlee Lilly MD    Date Last Seen by PCP:   Chief Complaint   Patient presents with    Diabetes Mellitus     pcp Dr. Lilly 9-20-17    Diabetic Foot Exam    Nail Care     Current shoe gear: casual shoes    No results found for: HGBA1C  There are no active problems to display for this patient.    Current Outpatient Prescriptions on File Prior to Visit   Medication Sig Dispense Refill    amlodipine (NORVASC) 5 MG tablet   1    BD INSULIN SYRINGE ULTRA-FINE 0.3 mL 31 gauge x 5/16 Syrg       CONTOUR NEXT STRIPS Strp   1    cyclobenzaprine (FLEXERIL) 5 MG tablet       ferrous sulfate 325 mg (65 mg iron) Tab tablet TK 1 T PO  BID  3    FLUZONE HIGH-DOSE 2017-18, PF, 180 mcg/0.5 mL vaccine ADM 0.5ML IM UTD  0    gabapentin (NEURONTIN) 800 MG tablet       glipiZIDE (GLUCOTROL) 5 MG tablet       LANTUS SOLOSTAR 100 unit/mL (3 mL) InPn pen       LEVEMIR FLEXTOUCH 100 unit/mL (3 mL) InPn pen       losartan (COZAAR) 25 MG tablet   1    LYRICA 75 mg capsule       meclizine (ANTIVERT) 25 mg tablet   0    MICROLET LANCET Misc   1    NOVOFINE 30 30 x 1/3 " Ndle       NOVOLOG 100 unit/mL injection       NOVOLOG FLEXPEN 100 unit/mL InPn pen Inject 5 Units into the skin 3 (three) times daily.      pantoprazole (PROTONIX) 40 MG tablet       pravastatin (PRAVACHOL) 40 MG tablet       PREVNAR 13, PF, " "0.5 mL Syrg ADM 0.5ML IM UTD  0    PROAIR HFA 90 mcg/actuation inhaler       tramadol (ULTRAM) 50 mg tablet       VITAMIN D2 50,000 unit capsule Take 50,000 Units by mouth every 7 days.  0     No current facility-administered medications on file prior to visit.      Review of patient's allergies indicates:  No Known Allergies  Past Surgical History:   Procedure Laterality Date    HYSTERECTOMY       Family History   Problem Relation Age of Onset    Hypertension Mother     Diabetes Father      Social History     Social History    Marital status:      Spouse name: N/A    Number of children: N/A    Years of education: N/A     Occupational History    Not on file.     Social History Main Topics    Smoking status: Former Smoker    Smokeless tobacco: Former User    Alcohol use Yes    Drug use: No    Sexual activity: Not on file     Other Topics Concern    Not on file     Social History Narrative    No narrative on file     Review of Systems   Constitution: Positive for weight loss (intentional). Negative for chills, fever and weakness.   Cardiovascular: Negative for claudication and leg swelling.   Respiratory: Negative for cough and shortness of breath.    Skin: Positive for dry skin and nail changes. Negative for itching and rash.   Musculoskeletal: Positive for arthritis and muscle cramps (nocturnal). Negative for falls, joint pain, joint swelling and muscle weakness.   Gastrointestinal: Negative for diarrhea, nausea and vomiting.   Neurological: Positive for numbness, paresthesias and sensory change. Negative for tremors.   Psychiatric/Behavioral: Negative for altered mental status and hallucinations.         Objective:       Vitals:    01/31/18 1057   BP: (!) 140/60   Weight: 108.9 kg (240 lb)   Height: 5' 7" (1.702 m)   PainSc: 0-No pain       Physical Exam   Constitutional:   General: Pt. is well-developed, well-nourished, appears stated age, in no acute distress, alert and oriented x 3. No " evidence of depression, anxiety, or agitation. Calm, cooperative, and communicative. Appropriate interactions and affect.       Cardiovascular:   Pulses:       Dorsalis pedis pulses are 1+ on the right side, and 1+ on the left side.        Posterior tibial pulses are 1+ on the right side, and 1+ on the left side.   Dorsalis pedis and posterior tibial pulses are diminished Bilaterally. Toes are cool to touch. Feet are warm proximally.There is decreased digital hair. Skin is atrophic     Musculoskeletal:        Right ankle: She exhibits normal range of motion and no swelling. No tenderness. Achilles tendon exhibits no pain and no defect.        Left ankle: She exhibits normal range of motion and no swelling. No tenderness. Achilles tendon exhibits no pain and no defect.        Right foot: There is normal range of motion and no tenderness.        Left foot: There is normal range of motion and no tenderness.   Decreased stride, station of gait.  apropulsive toe off.  Increased angle and base of gait.    Patient has hammertoes of digits 2-5 bilateral partially reducible without symptom today.    Visible and palpable bunion without pain at dorsomedial 1st metatarsal head right and left.  Hallux abducted right and left partially reducible, tracks laterally without being track bound.  No ecchymosis, erythema, edema, or cardinal signs infection or signs of trauma same foot.     Neurological: A sensory deficit is present.   [place]-Sonu 5.07 monofilamant testing is diminished Yadiel feet. Sharp/dull sensation diminished Bilaterally. Light touch diminished Bilaterally.   Skin: Skin is warm, dry and intact. No abrasion, no ecchymosis, no lesion and no rash noted. She is not diaphoretic. No cyanosis or erythema. No pallor. Nails show no clubbing.   Skin is atrophic.      Diminished pedal hair noted    Toenails 1-5 bilaterally are elongated by 2-3 mm, thickened by 2-3 mm, discolored/yellowed, dystrophic, brittle with subungual  debris.      Interdigital Spaces clean, dry and without evidence of break in skin integrity     Psychiatric: She has a normal mood and affect. Her speech is normal.   Nursing note and vitals reviewed.        Assessment:       Encounter Diagnoses   Name Primary?    Type II diabetes mellitus with neurological manifestations Yes    Onychomycosis due to dermatophyte          Plan:       Evangelina was seen today for diabetes mellitus, diabetic foot exam and nail care.    Diagnoses and all orders for this visit:    Type II diabetes mellitus with neurological manifestations    Onychomycosis due to dermatophyte      I counseled the patient on her conditions, their implications and medical management.    - Shoe inspection. Diabetic Foot Education. Patient reminded of the importance of good nutrition and blood sugar control to help prevent podiatric complications of diabetes. Patient instructed on proper foot hygeine. We discussed wearing proper shoe gear, daily foot inspections, never walking without protective shoe gear, never putting sharp instruments to feet.     - With patient's permission, nails were aggressively reduced and debrided x 10 to their soft tissue attachment mechanically and with electric , removing all offending nail and debris. Patient relates relief following the procedure. She will continue to monitor the areas daily, inspect her feet, wear protective shoe gear when ambulatory, moisturizer to maintain skin integrity and follow in this office in approximately 3-4 months, sooner p.r.n.

## 2018-05-02 ENCOUNTER — OFFICE VISIT (OUTPATIENT)
Dept: PODIATRY | Facility: CLINIC | Age: 73
End: 2018-05-02
Payer: MEDICARE

## 2018-05-02 VITALS
WEIGHT: 240.06 LBS | SYSTOLIC BLOOD PRESSURE: 144 MMHG | HEIGHT: 67 IN | DIASTOLIC BLOOD PRESSURE: 60 MMHG | BODY MASS INDEX: 37.68 KG/M2

## 2018-05-02 DIAGNOSIS — R20.2 PARESTHESIA: ICD-10-CM

## 2018-05-02 DIAGNOSIS — E11.49 TYPE II DIABETES MELLITUS WITH NEUROLOGICAL MANIFESTATIONS: Primary | ICD-10-CM

## 2018-05-02 DIAGNOSIS — L84 CORN OR CALLUS: ICD-10-CM

## 2018-05-02 DIAGNOSIS — B35.1 ONYCHOMYCOSIS DUE TO DERMATOPHYTE: ICD-10-CM

## 2018-05-02 DIAGNOSIS — M20.42 HAMMER TOES OF BOTH FEET: ICD-10-CM

## 2018-05-02 DIAGNOSIS — M20.41 HAMMER TOES OF BOTH FEET: ICD-10-CM

## 2018-05-02 DIAGNOSIS — M20.10 HALLUX ABDUCTO VALGUS, UNSPECIFIED LATERALITY: ICD-10-CM

## 2018-05-02 PROCEDURE — 99499 UNLISTED E&M SERVICE: CPT | Mod: S$GLB,,, | Performed by: PODIATRIST

## 2018-05-02 PROCEDURE — 11721 DEBRIDE NAIL 6 OR MORE: CPT | Mod: Q9,S$GLB,, | Performed by: PODIATRIST

## 2018-05-02 PROCEDURE — 99999 PR PBB SHADOW E&M-EST. PATIENT-LVL III: CPT | Mod: PBBFAC,,, | Performed by: PODIATRIST

## 2018-05-02 NOTE — PATIENT INSTRUCTIONS
Recommend lotions: eucerin, eucerin for diabetics, aquaphor, A&D ointment, gold bond for diabetics, sween, Goodells's Bees all purpose baby ointment,  urea 40 with aloe (found on amazon.com)    Shoe recommendations: (try 6pm.com, zappos.com , nordstromrack.Tiempo Listo, or shoes.Tiempo Listo for discounted prices) you can visit DSW shoes in [place]  or [employer] [facility] in the [facility] (there are also several shoe brand outlets in the [facility])    Asics (GT 2000 or gel foundations), new balance stability type shoes, saucony (stabil c3),  Atkinson (GTS or Beast or transcend), vionic, propet (tennis shoe)    sofft brand, clarks, crocs, aerosoles, naturalizers, SAS, ecco, born, orlando castro, rockports (dress shoes)    Vionic, burkenstocks, fitflops, propet (sandals)  Nike comfort thong sandals, crocs, propet (house shoes)    Nail Home remedy:  Vicks Vapor rub to nails for easier managability    Occasional soaks for 15-20 mins in luke warm water with 1 cup of listerine and 1 cup of apple cider vinegar are ok You may add several drops of oil of oregano or tea tree oil as well        Diabetes: Inspecting Your Feet  Diabetes increases your chances of developing foot problems. So inspect your feet every day. This helps you find small skin irritations before they become serious infections. If you have trouble seeing the bottoms of your feet, use a mirror or ask a family member or friend to help.     Pressure spots on the bottom of the foot are common areas where problems develop.   How to check your feet  Below are tips to help you look for foot problems. Try to check your feet at the same time each day, such as when you get out of bed in the morning:  · Check the top of each foot. The tops of toes, back of the heel, and outer edge of the foot can get a lot of rubbing from poor-fitting shoes.  · Check the bottom of each foot. Daily wear and tear often leads to problems at pressure spots.  · Check the toes and nails. Fungal infections often occur  between toes. Toenail problems can also be a sign of fungal infections or lead to breaks in the skin.  · Check your shoes, too. Loose objects inside a shoe can injure the foot. Use your hand to feel inside your shoes for things like isabela, loose stitching, or rough areas that could irritate your skin.  Warning signs  Look for any color changes in the foot. Redness with streaks can signal a severe infection, which needs immediate medical attention. Tell your doctor right away if you have any of these problems:  · Swelling, sometimes with color changes, may be a sign of poor blood flow or infection. Symptoms include tenderness and an increase in the size of your foot.  · Warm or hot areas on your feet may be signs of infection. A foot that is cold may not be getting enough blood.  · Sensations such as burning, tingling, or pins and needles can be signs of a problem. Also check for areas that may be numb.  · Hot spots are caused by friction or pressure. Look for hot spots in areas that get a lot of rubbing. Hot spots can turn into blisters, calluses, or sores.  · Cracks and sores are caused by dry or irritated skin. They are a sign that the skin is breaking down, which can lead to infection.  · Toenail problems to watch for include nails growing into the skin (ingrown toenail) and causing redness or pain. Thick, yellow, or discolored nails can signal a fungal infection.  · Drainage and odor can develop from untreated sores and ulcers. Call your doctor right away if you notice white or yellow drainage, bleeding, or unpleasant odor.   © 3577-9325 [employer]. [address] [place] [address]. All rights reserved. This information is not intended as a substitute for professional medical care. Always follow your healthcare professional's instructions.        Step-by-Step:  Inspecting Your Feet (Diabetes)    Date Last Reviewed: 10/1/2016  © 9036-9397 [employer]. [address] [place] [address]  Road, K[place] [address]. All rights reserved. This information is not intended as a substitute for professional medical care. Always follow your healthcare professional's instructions.

## 2018-05-02 NOTE — PROGRESS NOTES
"Subjective:      Patient ID: Evangelina Mallory is a 72 y.o. female.    Chief Complaint: Diabetes Mellitus (pcp Vijaya); Diabetic Foot Exam; and Nail Care    Evangelina is a 72 y.o. female who presents to the clinic for evaluation and treatment of high risk feet. Evangelina has a past medical history of Diabetes mellitus, type 2. The patient's chief complaint is elongated, thickened toenails aggravated by increased weight bearing, shoe gear, pressure. She complains of increasing neuropathic foot pains.. This patient has documented high risk feet requiring routine maintenance secondary to diabetes mellitis and those secondary complications of diabetes, as mentioned.       PCP: Ashlee Lilly MD    Date Last Seen by PCP:   Chief Complaint   Patient presents with    Diabetes Mellitus     pcp Vijaya    Diabetic Foot Exam    Nail Care     Current shoe gear: casual shoes    No results found for: HGBA1C  There are no active problems to display for this patient.    Current Outpatient Prescriptions on File Prior to Visit   Medication Sig Dispense Refill    amlodipine (NORVASC) 5 MG tablet   1    BD INSULIN SYRINGE ULTRA-FINE 0.3 mL 31 gauge x 5/16 Syrg       CONTOUR NEXT STRIPS Strp   1    cyclobenzaprine (FLEXERIL) 5 MG tablet       ferrous sulfate 325 mg (65 mg iron) Tab tablet TK 1 T PO  BID  3    FLUZONE HIGH-DOSE 2017-18, PF, 180 mcg/0.5 mL vaccine ADM 0.5ML IM UTD  0    gabapentin (NEURONTIN) 800 MG tablet       glipiZIDE (GLUCOTROL) 5 MG tablet       LANTUS SOLOSTAR 100 unit/mL (3 mL) InPn pen       LEVEMIR FLEXTOUCH 100 unit/mL (3 mL) InPn pen       losartan (COZAAR) 25 MG tablet   1    LYRICA 75 mg capsule       meclizine (ANTIVERT) 25 mg tablet   0    MICROLET LANCET Misc   1    NOVOFINE 30 30 x 1/3 " Ndle       NOVOLOG 100 unit/mL injection       NOVOLOG FLEXPEN 100 unit/mL InPn pen Inject 5 Units into the skin 3 (three) times daily.      pantoprazole (PROTONIX) 40 MG tablet       pravastatin (PRAVACHOL) 40 MG " "tablet       PREVNAR 13, PF, 0.5 mL Syrg ADM 0.5ML IM UTD  0    PROAIR HFA 90 mcg/actuation inhaler       tramadol (ULTRAM) 50 mg tablet       VITAMIN D2 50,000 unit capsule Take 50,000 Units by mouth every 7 days.  0     No current facility-administered medications on file prior to visit.      Review of patient's allergies indicates:  No Known Allergies  Past Surgical History:   Procedure Laterality Date    HYSTERECTOMY       Family History   Problem Relation Age of Onset    Hypertension Mother     Diabetes Father      Social History     Social History    Marital status:      Spouse name: N/A    Number of children: N/A    Years of education: N/A     Occupational History    Not on file.     Social History Main Topics    Smoking status: Former Smoker    Smokeless tobacco: Former User    Alcohol use Yes    Drug use: No    Sexual activity: Not on file     Other Topics Concern    Not on file     Social History Narrative    No narrative on file     Review of Systems   Constitution: Positive for weight loss (intentional). Negative for chills, fever and weakness.   Cardiovascular: Negative for claudication and leg swelling.   Respiratory: Negative for cough and shortness of breath.    Skin: Positive for dry skin and nail changes. Negative for itching and rash.   Musculoskeletal: Positive for arthritis and muscle cramps (nocturnal). Negative for falls, joint pain, joint swelling and muscle weakness.   Gastrointestinal: Negative for diarrhea, nausea and vomiting.   Neurological: Positive for numbness, paresthesias and sensory change. Negative for tremors.   Psychiatric/Behavioral: Negative for altered mental status and hallucinations.         Objective:       Vitals:    05/02/18 1118   BP: (!) 144/60   Weight: 108.9 kg (240 lb 1.3 oz)   Height: 5' 7" (1.702 m)   PainSc:   8   PainLoc: Foot       Physical Exam   Constitutional:   General: Pt. is well-developed, well-nourished, appears stated age, in " no acute distress, alert and oriented x 3. No evidence of depression, anxiety, or agitation. Calm, cooperative, and communicative. Appropriate interactions and affect.       Cardiovascular:   Pulses:       Dorsalis pedis pulses are 1+ on the right side, and 1+ on the left side.        Posterior tibial pulses are 1+ on the right side, and 1+ on the left side.   Dorsalis pedis and posterior tibial pulses are diminished Bilaterally. Toes are cool to touch. Feet are warm proximally.There is decreased digital hair. Skin is atrophic     Musculoskeletal:        Right ankle: She exhibits normal range of motion and no swelling. No tenderness. Achilles tendon exhibits no pain and no defect.        Left ankle: She exhibits normal range of motion and no swelling. No tenderness. Achilles tendon exhibits no pain and no defect.        Right foot: There is normal range of motion and no tenderness.        Left foot: There is normal range of motion and no tenderness.   Decreased stride, station of gait.  apropulsive toe off.  Increased angle and base of gait.    Patient has hammertoes of digits 2-5 bilateral partially reducible without symptom today.    Visible and palpable bunion without pain at dorsomedial 1st metatarsal head right and left.  Hallux abducted right and left partially reducible, tracks laterally without being track bound.  No ecchymosis, erythema, edema, or cardinal signs infection or signs of trauma same foot.     Neurological: A sensory deficit is present.   [place]-Sonu 5.07 monofilamant testing is diminished Yadiel feet. Sharp/dull sensation diminished Bilaterally. Light touch diminished Bilaterally.   Skin: Skin is warm, dry and intact. No abrasion, no ecchymosis, no lesion and no rash noted. She is not diaphoretic. No cyanosis or erythema. No pallor. Nails show no clubbing.   Skin is atrophic.      Diminished pedal hair noted    Toenails 1-5 bilaterally are elongated by 2-3 mm, thickened by 2-3 mm,  discolored/yellowed, dystrophic, brittle with subungual debris.      Interdigital Spaces clean, dry and without evidence of break in skin integrity     Psychiatric: She has a normal mood and affect. Her speech is normal.   Nursing note and vitals reviewed.        Assessment:       Encounter Diagnoses   Name Primary?    Type II diabetes mellitus with neurological manifestations Yes    Hallux abducto valgus, unspecified laterality     Corn or callus     Paresthesia     Onychomycosis due to dermatophyte     Hammer toes of both feet          Plan:       Evangelina was seen today for diabetes mellitus, diabetic foot exam and nail care.    Diagnoses and all orders for this visit:    Type II diabetes mellitus with neurological manifestations  -     DIABETIC SHOES FOR HOME USE    Hallux abducto valgus, unspecified laterality  -     DIABETIC SHOES FOR HOME USE    Corn or callus  -     DIABETIC SHOES FOR HOME USE    Paresthesia    Onychomycosis due to dermatophyte    Hammer toes of both feet  -     DIABETIC SHOES FOR HOME USE      I counseled the patient on her conditions, their implications and medical management.    - Shoe inspection. Diabetic Foot Education. Patient reminded of the importance of good nutrition and blood sugar control to help prevent podiatric complications of diabetes. Patient instructed on proper foot hygeine. We discussed wearing proper shoe gear, daily foot inspections, never walking without protective shoe gear, never putting sharp instruments to feet.     Rx diabetic shoes for protection and support    Recommended OTC pain creams as well as Alpha lipoic acid 600 mg Cap; Take 1 capsule by mouth once daily for neuropathy or a B complex vitamin daily    - With patient's permission, nails were aggressively reduced and debrided x 10 to their soft tissue attachment mechanically and with electric , removing all offending nail and debris. Patient relates relief following the procedure. She will  continue to monitor the areas daily, inspect her feet, wear protective shoe gear when ambulatory, moisturizer to maintain skin integrity and follow in this office in approximately 3-4 months, sooner p.r.n.

## 2018-08-02 ENCOUNTER — OFFICE VISIT (OUTPATIENT)
Dept: PODIATRY | Facility: CLINIC | Age: 73
End: 2018-08-02
Payer: MEDICARE

## 2018-08-02 VITALS
SYSTOLIC BLOOD PRESSURE: 136 MMHG | DIASTOLIC BLOOD PRESSURE: 60 MMHG | BODY MASS INDEX: 37.68 KG/M2 | WEIGHT: 240.06 LBS | HEIGHT: 67 IN

## 2018-08-02 DIAGNOSIS — M20.11 HALLUX ABDUCTO VALGUS, RIGHT: ICD-10-CM

## 2018-08-02 DIAGNOSIS — B35.1 ONYCHOMYCOSIS DUE TO DERMATOPHYTE: ICD-10-CM

## 2018-08-02 DIAGNOSIS — M20.12 HALLUX ABDUCTO VALGUS, LEFT: ICD-10-CM

## 2018-08-02 DIAGNOSIS — E11.49 TYPE II DIABETES MELLITUS WITH NEUROLOGICAL MANIFESTATIONS: Primary | ICD-10-CM

## 2018-08-02 PROCEDURE — 99499 UNLISTED E&M SERVICE: CPT | Mod: S$GLB,,, | Performed by: PODIATRIST

## 2018-08-02 PROCEDURE — 11721 DEBRIDE NAIL 6 OR MORE: CPT | Mod: Q9,S$GLB,, | Performed by: PODIATRIST

## 2018-08-02 PROCEDURE — 99999 PR PBB SHADOW E&M-EST. PATIENT-LVL III: CPT | Mod: PBBFAC,,, | Performed by: PODIATRIST

## 2018-08-02 RX ORDER — BLOOD GLUCOSE CONTROL HIGH,LOW
EACH MISCELLANEOUS
COMMUNITY
Start: 2018-05-08

## 2018-08-02 NOTE — PROGRESS NOTES
"Subjective:      Patient ID: Evangelina Mallory is a 72 y.o. female.    Chief Complaint: Diabetes Mellitus (pcp [facility] 9-26-18); Diabetic Foot Exam; and Nail Care    Evangelina is a 72 y.o. female who presents to the clinic for evaluation and treatment of high risk feet. Evangelina has a past medical history of Diabetes mellitus, type 2. The patient's chief complaint is elongated, thickened toenails aggravated by increased weight bearing, shoe gear, pressure. This patient has documented high risk feet requiring routine maintenance secondary to diabetes mellitis and those secondary complications of diabetes, as mentioned.       PCP: Ashlee Lilly MD    Date Last Seen by PCP:   Chief Complaint   Patient presents with    Diabetes Mellitus     pcp [facility] 9-26-18    Diabetic Foot Exam    Nail Care     Current shoe gear: casual shoes    No results found for: HGBA1C  Patient Active Problem List    Diagnosis Date Noted    Type II diabetes mellitus with neurological manifestations 08/02/2018     Current Outpatient Prescriptions on File Prior to Visit   Medication Sig Dispense Refill    amlodipine (NORVASC) 5 MG tablet Take 5 mg by mouth 2 (two) times daily.   1    BD INSULIN SYRINGE ULTRA-FINE 0.3 mL 31 gauge x 5/16 Syrg       CONTOUR NEXT STRIPS Strp   1    cyclobenzaprine (FLEXERIL) 5 MG tablet Take 5 mg by mouth nightly.       ferrous sulfate 325 mg (65 mg iron) Tab tablet TK 1 T PO  BID  3    gabapentin (NEURONTIN) 800 MG tablet Take 800 mg by mouth 3 (three) times daily.       glipiZIDE (GLUCOTROL) 5 MG tablet Take 5 mg by mouth 3 (three) times daily.       LEVEMIR FLEXTOUCH 100 unit/mL (3 mL) InPn pen Inject 33 Units into the skin every evening.       losartan (COZAAR) 25 MG tablet Take 25 mg by mouth once daily.   1    LYRICA 75 mg capsule Take 75 mg by mouth 2 (two) times daily.       meclizine (ANTIVERT) 25 mg tablet   0    MICROLET LANCET Misc   1    NOVOFINE 30 30 x 1/3 " Ndle       NOVOLOG FLEXPEN 100 unit/mL " InPn pen Inject 10 Units into the skin 3 (three) times daily.       pantoprazole (PROTONIX) 40 MG tablet Take 40 mg by mouth once daily.       pravastatin (PRAVACHOL) 40 MG tablet Take 40 mg by mouth once daily.       PROAIR HFA 90 mcg/actuation inhaler Inhale 2 puffs into the lungs every 6 (six) hours as needed.       tramadol (ULTRAM) 50 mg tablet Take 50 mg by mouth every 8 (eight) hours as needed.       FLUZONE HIGH-DOSE 2017-18, PF, 180 mcg/0.5 mL vaccine ADM 0.5ML IM UTD  0    PREVNAR 13, PF, 0.5 mL Syrg ADM 0.5ML IM UTD  0    VITAMIN D2 50,000 unit capsule Take 50,000 Units by mouth every 7 days.  0     No current facility-administered medications on file prior to visit.      Review of patient's allergies indicates:  No Known Allergies  Past Surgical History:   Procedure Laterality Date    HYSTERECTOMY       Family History   Problem Relation Age of Onset    Hypertension Mother     Diabetes Father      Social History     Social History    Marital status:      Spouse name: N/A    Number of children: N/A    Years of education: N/A     Occupational History    Not on file.     Social History Main Topics    Smoking status: Former Smoker    Smokeless tobacco: Former User    Alcohol use Yes    Drug use: No    Sexual activity: Not on file     Other Topics Concern    Not on file     Social History Narrative    No narrative on file     Review of Systems   Constitution: Positive for weight loss (intentional). Negative for chills, fever and weakness.   Cardiovascular: Negative for claudication and leg swelling.   Respiratory: Negative for cough and shortness of breath.    Skin: Positive for dry skin and nail changes. Negative for itching and rash.   Musculoskeletal: Positive for arthritis and muscle cramps (nocturnal). Negative for falls, joint pain, joint swelling and muscle weakness.   Gastrointestinal: Negative for diarrhea, nausea and vomiting.   Neurological: Positive for numbness,  "paresthesias and sensory change. Negative for tremors.   Psychiatric/Behavioral: Negative for altered mental status and hallucinations.         Objective:       Vitals:    08/02/18 1037   BP: 136/60   Weight: 108.9 kg (240 lb 1.3 oz)   Height: 5' 7" (1.702 m)   PainSc: 0-No pain       Physical Exam   Constitutional:   General: Pt. is well-developed, well-nourished, appears stated age, in no acute distress, alert and oriented x 3. No evidence of depression, anxiety, or agitation. Calm, cooperative, and communicative. Appropriate interactions and affect.       Cardiovascular:   Pulses:       Dorsalis pedis pulses are 1+ on the right side, and 1+ on the left side.        Posterior tibial pulses are 1+ on the right side, and 1+ on the left side.   Dorsalis pedis and posterior tibial pulses are diminished Bilaterally. Toes are cool to touch. Feet are warm proximally.There is decreased digital hair. Skin is atrophic     Musculoskeletal:        Right ankle: She exhibits normal range of motion and no swelling. No tenderness. Achilles tendon exhibits no pain and no defect.        Left ankle: She exhibits normal range of motion and no swelling. No tenderness. Achilles tendon exhibits no pain and no defect.        Right foot: There is normal range of motion and no tenderness.        Left foot: There is normal range of motion and no tenderness.   Decreased stride, station of gait.  apropulsive toe off.  Increased angle and base of gait.    Patient has hammertoes of digits 2-5 bilateral partially reducible without symptom today.    Visible and palpable bunion without pain at dorsomedial 1st metatarsal head right and left.  Hallux abducted right and left partially reducible, tracks laterally without being track bound.  No ecchymosis, erythema, edema, or cardinal signs infection or signs of trauma same foot.     Neurological: A sensory deficit is present.   [place]u 5.07 monofilamant testing is diminished Yadiel feet. " Sharp/dull sensation diminished Bilaterally. Light touch diminished Bilaterally.   Skin: Skin is warm, dry and intact. No abrasion, no ecchymosis, no lesion and no rash noted. She is not diaphoretic. No cyanosis or erythema. No pallor. Nails show no clubbing.   Skin is atrophic.      Diminished pedal hair noted    Toenails 1-5 bilaterally are elongated by 2-3 mm, thickened by 2-3 mm, discolored/yellowed, dystrophic, brittle with subungual debris.      Interdigital Spaces clean, dry and without evidence of break in skin integrity     Psychiatric: She has a normal mood and affect. Her speech is normal.   Nursing note and vitals reviewed.        Assessment:       Encounter Diagnoses   Name Primary?    Type II diabetes mellitus with neurological manifestations Yes    Hallux abducto valgus, left     Hallux abducto valgus, right     Onychomycosis due to dermatophyte          Plan:       Evangelina was seen today for diabetes mellitus, diabetic foot exam and nail care.    Diagnoses and all orders for this visit:    Type II diabetes mellitus with neurological manifestations    Hallux abducto valgus, left    Hallux abducto valgus, right    Onychomycosis due to dermatophyte      I counseled the patient on her conditions, their implications and medical management.    - Shoe inspection. Diabetic Foot Education. Patient reminded of the importance of good nutrition and blood sugar control to help prevent podiatric complications of diabetes. Patient instructed on proper foot hygeine. We discussed wearing proper shoe gear, daily foot inspections, never walking without protective shoe gear, never putting sharp instruments to feet.      - With patient's permission, nails were aggressively reduced and debrided x 10 to their soft tissue attachment mechanically and with electric , removing all offending nail and debris. Patient relates relief following the procedure. She will continue to monitor the areas daily, inspect her feet,  wear protective shoe gear when ambulatory, moisturizer to maintain skin integrity and follow in this office in approximately 3-4 months, sooner p.r.n.

## 2018-08-02 NOTE — PATIENT INSTRUCTIONS
Recommend over the counter medicine for nerve laure:  - Capsaicin cream to rub on at night  -  Absorbine Veterinary Liniment Gel Topical Analgesic Sore Muscle and Joint Pain Relief (found at pet store)  - Alpha lipoic acid 600 mg Cap; Take 1 capsule by mouth once daily for neuropathy or a B complex vitamin daily      Recommend lotions: eucerin, eucerin for diabetics, aquaphor, A&D ointment, gold bond for diabetics, sween, Javad's Bees all purpose baby ointment,  urea 40 with aloe (found on amazon.com)    Shoe recommendations: (try 6pm.com, [email or web address].[email or web address] , [email or web address], or shoes.[email or web address] for discounted prices) you can visit DSW shoes in [employer]  or Absio in the [facility] (there are also several shoe brand outlets in the [facility])    Asics (GT 2000 or gel foundations), new balance stability type shoes, saucony (stabil c3),  Atkinson (GTS or Beast or transcend), vionic, propet (tennis shoe)    sofabram brand, clarks, crocs, aerosoles, naturalizers, SAS, ecco, born, orlando castro, rockports (dress shoes)    Vionic, burkenstocks, fitflops, propet (sandals)  Nike comfort thong sandals, crocs, propet (house shoes)    Nail Home remedy:  Vicks Vapor rub to nails for easier managability    Occasional soaks for 15-20 mins in luke warm water with 1 cup of listerine and 1 cup of apple cider vinegar are ok You may add several drops of oil of oregano or tea tree oil as well        Diabetes: Inspecting Your Feet  Diabetes increases your chances of developing foot problems. So inspect your feet every day. This helps you find small skin irritations before they become serious infections. If you have trouble seeing the bottoms of your feet, use a mirror or ask a family member or friend to help.     Pressure spots on the bottom of the foot are common areas where problems develop.   How to check your feet  Below are tips to help you look for foot problems. Try to check your feet at the same time each day, such as when you get out of bed  in the morning:  · Check the top of each foot. The tops of toes, back of the heel, and outer edge of the foot can get a lot of rubbing from poor-fitting shoes.  · Check the bottom of each foot. Daily wear and tear often leads to problems at pressure spots.  · Check the toes and nails. Fungal infections often occur between toes. Toenail problems can also be a sign of fungal infections or lead to breaks in the skin.  · Check your shoes, too. Loose objects inside a shoe can injure the foot. Use your hand to feel inside your shoes for things like isabela, loose stitching, or rough areas that could irritate your skin.  Warning signs  Look for any color changes in the foot. Redness with streaks can signal a severe infection, which needs immediate medical attention. Tell your doctor right away if you have any of these problems:  · Swelling, sometimes with color changes, may be a sign of poor blood flow or infection. Symptoms include tenderness and an increase in the size of your foot.  · Warm or hot areas on your feet may be signs of infection. A foot that is cold may not be getting enough blood.  · Sensations such as burning, tingling, or pins and needles can be signs of a problem. Also check for areas that may be numb.  · Hot spots are caused by friction or pressure. Look for hot spots in areas that get a lot of rubbing. Hot spots can turn into blisters, calluses, or sores.  · Cracks and sores are caused by dry or irritated skin. They are a sign that the skin is breaking down, which can lead to infection.  · Toenail problems to watch for include nails growing into the skin (ingrown toenail) and causing redness or pain. Thick, yellow, or discolored nails can signal a fungal infection.  · Drainage and odor can develop from untreated sores and ulcers. Call your doctor right away if you notice white or yellow drainage, bleeding, or unpleasant odor.   © 7113-7552 [employer]. [address] [place] [address]  [place] [address]. All rights reserved. This information is not intended as a substitute for professional medical care. Always follow your healthcare professional's instructions.        Step-by-Step:  Inspecting Your Feet (Diabetes)    Date Last Reviewed: 10/1/2016  © 3351-1708 [employer]. [address] [place] [address], [place] [address]. All rights reserved. This information is not intended as a substitute for professional medical care. Always follow your healthcare professional's instructions.

## 2018-11-08 ENCOUNTER — OFFICE VISIT (OUTPATIENT)
Dept: PODIATRY | Facility: CLINIC | Age: 73
End: 2018-11-08
Payer: MEDICARE

## 2018-11-08 VITALS
HEIGHT: 67 IN | WEIGHT: 240.06 LBS | BODY MASS INDEX: 37.68 KG/M2 | SYSTOLIC BLOOD PRESSURE: 102 MMHG | DIASTOLIC BLOOD PRESSURE: 50 MMHG

## 2018-11-08 DIAGNOSIS — M20.11 HALLUX ABDUCTO VALGUS, RIGHT: ICD-10-CM

## 2018-11-08 DIAGNOSIS — M20.12 HALLUX ABDUCTO VALGUS, LEFT: ICD-10-CM

## 2018-11-08 DIAGNOSIS — B35.1 ONYCHOMYCOSIS DUE TO DERMATOPHYTE: ICD-10-CM

## 2018-11-08 DIAGNOSIS — E11.49 TYPE II DIABETES MELLITUS WITH NEUROLOGICAL MANIFESTATIONS: Primary | ICD-10-CM

## 2018-11-08 PROCEDURE — 99499 UNLISTED E&M SERVICE: CPT | Mod: S$GLB,,, | Performed by: PODIATRIST

## 2018-11-08 PROCEDURE — 99999 PR PBB SHADOW E&M-EST. PATIENT-LVL III: CPT | Mod: PBBFAC,,, | Performed by: PODIATRIST

## 2018-11-08 PROCEDURE — 11721 DEBRIDE NAIL 6 OR MORE: CPT | Mod: Q9,S$GLB,, | Performed by: PODIATRIST

## 2018-11-08 RX ORDER — HUMAN INSULIN 100 [USP'U]/ML
INJECTION, SUSPENSION SUBCUTANEOUS
COMMUNITY
Start: 2018-10-30

## 2018-11-08 NOTE — PROGRESS NOTES
Subjective:      Patient ID: Evangelina Mallory is a 73 y.o. female.    Chief Complaint: Diabetes Mellitus (pcp [facility] ); Diabetic Foot Exam; and Nail Care    Evangelina is a 73 y.o. female who presents to the clinic for evaluation and treatment of high risk feet. Evangelina has a past medical history of Diabetes mellitus, type 2. The patient's chief complaint is elongated, thickened toenails aggravated by increased weight bearing, shoe gear, pressure. This patient has documented high risk feet requiring routine maintenance secondary to diabetes mellitis and those secondary complications of diabetes, as mentioned.       PCP: Ashlee Lilly MD    Date Last Seen by PCP:   Chief Complaint   Patient presents with    Diabetes Mellitus     pcp [facility]     Diabetic Foot Exam    Nail Care     Current shoe gear: wedge casual shoes    No results found for: HGBA1C  Patient Active Problem List    Diagnosis Date Noted    Type II diabetes mellitus with neurological manifestations 08/02/2018     Current Outpatient Medications on File Prior to Visit   Medication Sig Dispense Refill    ACCU-CHEK DONIS CONTROL SOLN Soln       ACCU-CHEK DONIS PLUS METER Misc       amlodipine (NORVASC) 5 MG tablet Take 5 mg by mouth 2 (two) times daily.   1    BD INSULIN SYRINGE ULTRA-FINE 0.3 mL 31 gauge x 5/16 Syrg       CONTOUR NEXT STRIPS Strp   1    cyclobenzaprine (FLEXERIL) 5 MG tablet Take 5 mg by mouth nightly.       ferrous sulfate 325 mg (65 mg iron) Tab tablet TK 1 T PO  BID  3    FLUZONE HIGH-DOSE 2017-18, PF, 180 mcg/0.5 mL vaccine ADM 0.5ML IM UTD  0    gabapentin (NEURONTIN) 800 MG tablet Take 800 mg by mouth 3 (three) times daily.       glipiZIDE (GLUCOTROL) 5 MG tablet Take 5 mg by mouth 3 (three) times daily.       LEVEMIR FLEXTOUCH 100 unit/mL (3 mL) InPn pen Inject 33 Units into the skin every evening.       losartan (COZAAR) 25 MG tablet Take 25 mg by mouth once daily.   1    LYRICA 75 mg capsule Take 75 mg by mouth 2 (two) times  "daily.       meclizine (ANTIVERT) 25 mg tablet   0    MICROLET LANCET Misc   1    NOVOFINE 30 30 x 1/3 " Ndle       NOVOLIN 70/30 U-100 INSULIN 100 unit/mL (70-30) injection       NOVOLOG FLEXPEN 100 unit/mL InPn pen Inject 10 Units into the skin 3 (three) times daily.       pantoprazole (PROTONIX) 40 MG tablet Take 40 mg by mouth once daily.       pravastatin (PRAVACHOL) 40 MG tablet Take 40 mg by mouth once daily.       PREVNAR 13, PF, 0.5 mL Syrg ADM 0.5ML IM UTD  0    PROAIR HFA 90 mcg/actuation inhaler Inhale 2 puffs into the lungs every 6 (six) hours as needed.       tramadol (ULTRAM) 50 mg tablet Take 50 mg by mouth every 8 (eight) hours as needed.       VITAMIN D2 50,000 unit capsule Take 50,000 Units by mouth every 7 days.  0     No current facility-administered medications on file prior to visit.      Review of patient's allergies indicates:  No Known Allergies  Past Surgical History:   Procedure Laterality Date    HYSTERECTOMY       Family History   Problem Relation Age of Onset    Hypertension Mother     Diabetes Father      Social History     Socioeconomic History    Marital status:      Spouse name: Not on file    Number of children: Not on file    Years of education: Not on file    Highest education level: Not on file   Social Needs    Financial resource strain: Not on file    Food insecurity - worry: Not on file    Food insecurity - inability: Not on file    Transportation needs - medical: Not on file    Transportation needs - non-medical: Not on file   Occupational History    Not on file   Tobacco Use    Smoking status: Former Smoker    Smokeless tobacco: Former User   Substance and Sexual Activity    Alcohol use: Yes    Drug use: No    Sexual activity: Not on file   Other Topics Concern    Not on file   Social History Narrative    Not on file     Review of Systems   Constitution: Positive for weight loss (intentional). Negative for chills, fever and weakness. " "  Cardiovascular: Negative for claudication and leg swelling.   Respiratory: Negative for cough and shortness of breath.    Skin: Positive for dry skin and nail changes. Negative for itching and rash.   Musculoskeletal: Positive for arthritis and muscle cramps (nocturnal). Negative for falls, joint pain, joint swelling and muscle weakness.   Gastrointestinal: Negative for diarrhea, nausea and vomiting.   Neurological: Positive for numbness, paresthesias and sensory change. Negative for tremors.   Psychiatric/Behavioral: Negative for altered mental status and hallucinations.         Objective:       Vitals:    11/08/18 1124   BP: (!) 102/50   Weight: 108.9 kg (240 lb 1.3 oz)   Height: 5' 7" (1.702 m)   PainSc: 10-Worst pain ever   PainLoc: Foot       Physical Exam   Constitutional:   General: Pt. is well-developed, well-nourished, appears stated age, in no acute distress, alert and oriented x 3. No evidence of depression, anxiety, or agitation. Calm, cooperative, and communicative. Appropriate interactions and affect.       Cardiovascular:   Pulses:       Dorsalis pedis pulses are 1+ on the right side, and 1+ on the left side.        Posterior tibial pulses are 1+ on the right side, and 1+ on the left side.   Dorsalis pedis and posterior tibial pulses are diminished Bilaterally. Toes are cool to touch. Feet are warm proximally.There is decreased digital hair. Skin is atrophic     Musculoskeletal:        Right ankle: She exhibits normal range of motion and no swelling. No tenderness. Achilles tendon exhibits no pain and no defect.        Left ankle: She exhibits normal range of motion and no swelling. No tenderness. Achilles tendon exhibits no pain and no defect.        Right foot: There is normal range of motion and no tenderness.        Left foot: There is normal range of motion and no tenderness.   Decreased stride, station of gait.  apropulsive toe off.  Increased angle and base of gait.    Patient has hammertoes " of digits 2-5 bilateral partially reducible without symptom today.    Visible and palpable bunion without pain at dorsomedial 1st metatarsal head right and left.  Hallux abducted right and left partially reducible, tracks laterally without being track bound.  No ecchymosis, erythema, edema, or cardinal signs infection or signs of trauma same foot.     Neurological: A sensory deficit is present.   [place]-Sonu 5.07 monofilamant testing is diminished Yadiel feet. Sharp/dull sensation diminished Bilaterally. Light touch diminished Bilaterally.   Skin: Skin is warm, dry and intact. No abrasion, no ecchymosis, no lesion and no rash noted. She is not diaphoretic. No cyanosis or erythema. No pallor. Nails show no clubbing.   Skin is atrophic.      Diminished pedal hair noted    Toenails 1-5 bilaterally are elongated by 2-3 mm, thickened by 2-3 mm, discolored/yellowed, dystrophic, brittle with subungual debris.      Interdigital Spaces clean, dry and without evidence of break in skin integrity     Psychiatric: She has a normal mood and affect. Her speech is normal.   Nursing note and vitals reviewed.        Assessment:       Encounter Diagnoses   Name Primary?    Type II diabetes mellitus with neurological manifestations Yes    Hallux abducto valgus, left     Hallux abducto valgus, right     Onychomycosis due to dermatophyte          Plan:       Evangelina was seen today for diabetes mellitus, diabetic foot exam and nail care.    Diagnoses and all orders for this visit:    Type II diabetes mellitus with neurological manifestations    Hallux abducto valgus, left    Hallux abducto valgus, right    Onychomycosis due to dermatophyte      I counseled the patient on her conditions, their implications and medical management.    - Shoe inspection. Diabetic Foot Education. Patient reminded of the importance of good nutrition and blood sugar control to help prevent podiatric complications of diabetes. Patient instructed on proper  foot hygeine. We discussed wearing proper shoe gear, daily foot inspections, never walking without protective shoe gear, never putting sharp instruments to feet.      - With patient's permission, nails were aggressively reduced and debrided x 10 to their soft tissue attachment mechanically and with electric , removing all offending nail and debris. Patient relates relief following the procedure. She will continue to monitor the areas daily, inspect her feet, wear protective shoe gear when ambulatory, moisturizer to maintain skin integrity and follow in this office in approximately 3-4 months, sooner p.r.n.

## 2019-02-18 ENCOUNTER — OFFICE VISIT (OUTPATIENT)
Dept: PODIATRY | Facility: CLINIC | Age: 74
End: 2019-02-18
Payer: MEDICARE

## 2019-02-18 VITALS — HEIGHT: 67 IN | WEIGHT: 240 LBS | BODY MASS INDEX: 37.67 KG/M2

## 2019-02-18 DIAGNOSIS — L84 CORN OR CALLUS: ICD-10-CM

## 2019-02-18 DIAGNOSIS — B35.1 ONYCHOMYCOSIS DUE TO DERMATOPHYTE: ICD-10-CM

## 2019-02-18 DIAGNOSIS — M20.12 HALLUX ABDUCTO VALGUS, LEFT: ICD-10-CM

## 2019-02-18 DIAGNOSIS — M20.11 HALLUX ABDUCTO VALGUS, RIGHT: ICD-10-CM

## 2019-02-18 DIAGNOSIS — E11.49 TYPE II DIABETES MELLITUS WITH NEUROLOGICAL MANIFESTATIONS: Primary | ICD-10-CM

## 2019-02-18 PROCEDURE — 11721 PR DEBRIDEMENT OF NAILS, 6 OR MORE: ICD-10-PCS | Mod: Q9,S$GLB,, | Performed by: PODIATRIST

## 2019-02-18 PROCEDURE — 11721 DEBRIDE NAIL 6 OR MORE: CPT | Mod: Q9,S$GLB,, | Performed by: PODIATRIST

## 2019-02-18 PROCEDURE — 99499 NO LOS: ICD-10-PCS | Mod: S$GLB,,, | Performed by: PODIATRIST

## 2019-02-18 PROCEDURE — 99999 PR PBB SHADOW E&M-EST. PATIENT-LVL III: ICD-10-PCS | Mod: PBBFAC,,, | Performed by: PODIATRIST

## 2019-02-18 PROCEDURE — 99499 UNLISTED E&M SERVICE: CPT | Mod: S$GLB,,, | Performed by: PODIATRIST

## 2019-02-18 PROCEDURE — 99999 PR PBB SHADOW E&M-EST. PATIENT-LVL III: CPT | Mod: PBBFAC,,, | Performed by: PODIATRIST

## 2019-02-18 NOTE — PROGRESS NOTES
Subjective:      Patient ID: Evangelina Mallory is a 73 y.o. female.    Chief Complaint: Diabetes Mellitus (Pcp Dr. Lilly ); Diabetic Foot Exam; and Nail Care    Evangelina is a 73 y.o. female who presents to the clinic for evaluation and treatment of high risk feet. Evangelina has a past medical history of Diabetes mellitus, type 2. The patient's chief complaint is increased numbness and tingling of the feet.  She also complains of elongated, thickened toenails aggravated by increased weight bearing, shoe gear, pressure. This patient has documented high risk feet requiring routine maintenance secondary to diabetes mellitis and those secondary complications of diabetes, as mentioned.       PCP: Ashlee Lilly MD    Date Last Seen by PCP:   Chief Complaint   Patient presents with    Diabetes Mellitus     Pcp Dr. Lilly     Diabetic Foot Exam    Nail Care     Current shoe gear: boat shoes    No results found for: HGBA1C  Patient Active Problem List    Diagnosis Date Noted    Type II diabetes mellitus with neurological manifestations 08/02/2018     Current Outpatient Medications on File Prior to Visit   Medication Sig Dispense Refill    ACCU-CHEK DONIS CONTROL SOLN Soln       ACCU-CHEK DONIS PLUS METER Misc       amlodipine (NORVASC) 5 MG tablet Take 5 mg by mouth 2 (two) times daily.   1    BD INSULIN SYRINGE ULTRA-FINE 0.3 mL 31 gauge x 5/16 Syrg       CONTOUR NEXT STRIPS Strp   1    cyclobenzaprine (FLEXERIL) 5 MG tablet Take 5 mg by mouth nightly.       ferrous sulfate 325 mg (65 mg iron) Tab tablet TK 1 T PO  BID  3    FLUZONE HIGH-DOSE 2017-18, PF, 180 mcg/0.5 mL vaccine ADM 0.5ML IM UTD  0    gabapentin (NEURONTIN) 800 MG tablet Take 800 mg by mouth 3 (three) times daily.       glipiZIDE (GLUCOTROL) 5 MG tablet Take 5 mg by mouth 3 (three) times daily.       LEVEMIR FLEXTOUCH 100 unit/mL (3 mL) InPn pen Inject 33 Units into the skin every evening.       losartan (COZAAR) 25 MG tablet Take 25 mg by mouth once  "daily.   1    LYRICA 75 mg capsule Take 75 mg by mouth 2 (two) times daily.       meclizine (ANTIVERT) 25 mg tablet   0    MICROLET LANCET Misc   1    NOVOFINE 30 30 x 1/3 " Ndle       NOVOLIN 70/30 U-100 INSULIN 100 unit/mL (70-30) injection       NOVOLOG FLEXPEN 100 unit/mL InPn pen Inject 10 Units into the skin 3 (three) times daily.       pantoprazole (PROTONIX) 40 MG tablet Take 40 mg by mouth once daily.       pravastatin (PRAVACHOL) 40 MG tablet Take 40 mg by mouth once daily.       PREVNAR 13, PF, 0.5 mL Syrg ADM 0.5ML IM UTD  0    PROAIR HFA 90 mcg/actuation inhaler Inhale 2 puffs into the lungs every 6 (six) hours as needed.       tramadol (ULTRAM) 50 mg tablet Take 50 mg by mouth every 8 (eight) hours as needed.       VITAMIN D2 50,000 unit capsule Take 50,000 Units by mouth every 7 days.  0     No current facility-administered medications on file prior to visit.      Review of patient's allergies indicates:  No Known Allergies  Past Surgical History:   Procedure Laterality Date    HYSTERECTOMY       Family History   Problem Relation Age of Onset    Hypertension Mother     Diabetes Father      Social History     Socioeconomic History    Marital status:      Spouse name: Not on file    Number of children: Not on file    Years of education: Not on file    Highest education level: Not on file   Social Needs    Financial resource strain: Not on file    Food insecurity - worry: Not on file    Food insecurity - inability: Not on file    Transportation needs - medical: Not on file    Transportation needs - non-medical: Not on file   Occupational History    Not on file   Tobacco Use    Smoking status: Former Smoker    Smokeless tobacco: Former User   Substance and Sexual Activity    Alcohol use: Yes    Drug use: No    Sexual activity: Not on file   Other Topics Concern    Not on file   Social History Narrative    Not on file     Review of Systems   Constitution: Positive for " "weight loss (intentional). Negative for chills, fever and weakness.   Cardiovascular: Negative for claudication and leg swelling.   Respiratory: Negative for cough and shortness of breath.    Skin: Positive for dry skin and nail changes. Negative for itching and rash.   Musculoskeletal: Positive for arthritis and muscle cramps (nocturnal). Negative for falls, joint pain, joint swelling and muscle weakness.   Gastrointestinal: Negative for diarrhea, nausea and vomiting.   Neurological: Positive for numbness, paresthesias and sensory change. Negative for tremors.   Psychiatric/Behavioral: Negative for altered mental status and hallucinations.         Objective:       Vitals:    02/18/19 1127   Weight: 108.9 kg (240 lb)   Height: 5' 7" (1.702 m)   PainSc: 0-No pain       Physical Exam   Constitutional:   General: Pt. is well-developed, well-nourished, appears stated age, in no acute distress, alert and oriented x 3. No evidence of depression, anxiety, or agitation. Calm, cooperative, and communicative. Appropriate interactions and affect.       Cardiovascular:   Pulses:       Dorsalis pedis pulses are 1+ on the right side, and 1+ on the left side.        Posterior tibial pulses are 1+ on the right side, and 1+ on the left side.   Dorsalis pedis and posterior tibial pulses are diminished Bilaterally. Toes are cool to touch. Feet are warm proximally.There is decreased digital hair. Skin is atrophic     Musculoskeletal:        Right ankle: She exhibits normal range of motion and no swelling. No tenderness. Achilles tendon exhibits no pain and no defect.        Left ankle: She exhibits normal range of motion and no swelling. No tenderness. Achilles tendon exhibits no pain and no defect.        Right foot: There is normal range of motion and no tenderness.        Left foot: There is normal range of motion and no tenderness.   Decreased stride, station of gait.  apropulsive toe off.  Increased angle and base of " gait.    Patient has hammertoes of digits 2-5 bilateral partially reducible without symptom today.    Visible and palpable bunion without pain at dorsomedial 1st metatarsal head right and left.  Hallux abducted right and left partially reducible, tracks laterally without being track bound.  No ecchymosis, erythema, edema, or cardinal signs infection or signs of trauma same foot.     Neurological: A sensory deficit is present.   [place]-Sonu 5.07 monofilamant testing is diminished Yadiel feet. Sharp/dull sensation diminished Bilaterally. Light touch diminished Bilaterally.   Skin: Skin is warm, dry and intact. No abrasion, no ecchymosis, no lesion and no rash noted. She is not diaphoretic. No cyanosis or erythema. No pallor. Nails show no clubbing.   Skin is atrophic.      Diminished pedal hair noted    Toenails 1-5 bilaterally are elongated by 2-3 mm, thickened by 2-3 mm, discolored/yellowed, dystrophic, brittle with subungual debris.      Interdigital Spaces clean, dry and without evidence of break in skin integrity     Psychiatric: She has a normal mood and affect. Her speech is normal.   Nursing note and vitals reviewed.        Assessment:       Encounter Diagnoses   Name Primary?    Type II diabetes mellitus with neurological manifestations Yes    Hallux abducto valgus, left     Hallux abducto valgus, right     Onychomycosis due to dermatophyte     Corn or callus          Plan:       Evangelina was seen today for diabetes mellitus, diabetic foot exam and nail care.    Diagnoses and all orders for this visit:    Type II diabetes mellitus with neurological manifestations    Hallux abducto valgus, left    Hallux abducto valgus, right    Onychomycosis due to dermatophyte    Corn or callus      I counseled the patient on her conditions, their implications and medical management.    - Shoe inspection. Diabetic Foot Education. Patient reminded of the importance of good nutrition and blood sugar control to help prevent  podiatric complications of diabetes. Patient instructed on proper foot hygeine. We discussed wearing proper shoe gear, daily foot inspections, never walking without protective shoe gear, never putting sharp instruments to feet.      Patients chart submitted for  PDN study for worsening paresthesias/neuropathy.    - With patient's permission, nails were aggressively reduced and debrided x 10 to their soft tissue attachment mechanically and with electric , removing all offending nail and debris. Patient relates relief following the procedure. She will continue to monitor the areas daily, inspect her feet, wear protective shoe gear when ambulatory, moisturizer to maintain skin integrity and follow in this office in approximately 3-4 months, sooner p.r.n.

## 2019-02-18 NOTE — PATIENT INSTRUCTIONS
Recommend lotions: eucerin, eucerin for diabetics, aquaphor, A&D ointment, gold bond for diabetics, sween, Hogeland's Bees all purpose baby ointment,  urea 40 with aloe (found on amazon.com)    Shoe recommendations: (try 6pm.com, zappos.com , [email or web address].[email or web address], or shoes[email or web address] for discounted prices) you can visit [employer] shoes in [place]  or [employer] [facility] in the [facility] (there are also several shoe brand outlets in the [facility])    Asics (GT 2000 or gel foundations), new balance stability type shoes, saucony (stabil c3),  Atkinson (GTS or Beast or transcend), propet (tennis shoe)    Sofabram Kirby (women) Ilene&Travon (men), clarks, crocs, aerosoles, naturalizers, SAS, ecco, born, orlando castro, rockports (dress shoes)    Vionic, burkenstocks, fitflops, propet (sandals)  Nike comfort thong sandals, crocs, propet (house shoes)    Nail Home remedy:  Vicks Vapor rub to nails for easier manageability      Diabetes: Inspecting Your Feet  Diabetes increases your chances of developing foot problems. So inspect your feet every day. This helps you find small skin irritations before they become serious infections. If you have trouble seeing the bottoms of your feet, use a mirror or ask a family member or friend to help.     Pressure spots on the bottom of the foot are common areas where problems develop.   How to check your feet  Below are tips to help you look for foot problems. Try to check your feet at the same time each day, such as when you get out of bed in the morning:  · Check the top of each foot. The tops of toes, back of the heel, and outer edge of the foot can get a lot of rubbing from poor-fitting shoes.  · Check the bottom of each foot. Daily wear and tear often leads to problems at pressure spots.  · Check the toes and nails. Fungal infections often occur between toes. Toenail problems can also be a sign of fungal infections or lead to breaks in the skin.  · Check your shoes, too. Loose objects inside a shoe can injure the  foot. Use your hand to feel inside your shoes for things like isabela, loose stitching, or rough areas that could irritate your skin.  Warning signs  Look for any color changes in the foot. Redness with streaks can signal a severe infection, which needs immediate medical attention. Tell your doctor right away if you have any of these problems:  · Swelling, sometimes with color changes, may be a sign of poor blood flow or infection. Symptoms include tenderness and an increase in the size of your foot.  · Warm or hot areas on your feet may be signs of infection. A foot that is cold may not be getting enough blood.  · Sensations such as burning, tingling, or pins and needles can be signs of a problem. Also check for areas that may be numb.  · Hot spots are caused by friction or pressure. Look for hot spots in areas that get a lot of rubbing. Hot spots can turn into blisters, calluses, or sores.  · Cracks and sores are caused by dry or irritated skin. They are a sign that the skin is breaking down, which can lead to infection.  · Toenail problems to watch for include nails growing into the skin (ingrown toenail) and causing redness or pain. Thick, yellow, or discolored nails can signal a fungal infection.  · Drainage and odor can develop from untreated sores and ulcers. Call your doctor right away if you notice white or yellow drainage, bleeding, or unpleasant odor.   © 3209-7253 [employer] [address] [place] [address]. All rights reserved. This information is not intended as a substitute for professional medical care. Always follow your healthcare professional's instructions.        Step-by-Step:  Inspecting Your Feet (Diabetes)    Date Last Reviewed: 10/1/2016  © 6780-4619 [employer] [address] [place] [address]. All rights reserved. This information is not intended as a substitute for professional medical care. Always follow your healthcare professional's  instructions.

## 2019-02-22 ENCOUNTER — TELEPHONE (OUTPATIENT)
Dept: RESEARCH | Facility: OTHER | Age: 74
End: 2019-02-22

## 2019-02-22 NOTE — TELEPHONE ENCOUNTER
Left voicemail requesting a return call to discuss the Painful Diabetic Neuropathy (IRB# 2017.196) study.

## 2019-05-20 ENCOUNTER — OFFICE VISIT (OUTPATIENT)
Dept: PODIATRY | Facility: CLINIC | Age: 74
End: 2019-05-20
Payer: MEDICARE

## 2019-05-20 VITALS
DIASTOLIC BLOOD PRESSURE: 76 MMHG | HEIGHT: 67 IN | SYSTOLIC BLOOD PRESSURE: 132 MMHG | BODY MASS INDEX: 37.67 KG/M2 | WEIGHT: 240 LBS

## 2019-05-20 DIAGNOSIS — M20.41 HAMMER TOES OF BOTH FEET: ICD-10-CM

## 2019-05-20 DIAGNOSIS — E11.49 TYPE II DIABETES MELLITUS WITH NEUROLOGICAL MANIFESTATIONS: Primary | ICD-10-CM

## 2019-05-20 DIAGNOSIS — M20.12 HALLUX ABDUCTO VALGUS, LEFT: ICD-10-CM

## 2019-05-20 DIAGNOSIS — M20.11 HALLUX ABDUCTO VALGUS, RIGHT: ICD-10-CM

## 2019-05-20 DIAGNOSIS — B35.1 ONYCHOMYCOSIS DUE TO DERMATOPHYTE: ICD-10-CM

## 2019-05-20 DIAGNOSIS — M20.42 HAMMER TOES OF BOTH FEET: ICD-10-CM

## 2019-05-20 PROCEDURE — 11721 PR DEBRIDEMENT OF NAILS, 6 OR MORE: ICD-10-PCS | Mod: Q9,S$GLB,, | Performed by: PODIATRIST

## 2019-05-20 PROCEDURE — 99499 UNLISTED E&M SERVICE: CPT | Mod: S$GLB,,, | Performed by: PODIATRIST

## 2019-05-20 PROCEDURE — 99499 NO LOS: ICD-10-PCS | Mod: S$GLB,,, | Performed by: PODIATRIST

## 2019-05-20 PROCEDURE — 11721 DEBRIDE NAIL 6 OR MORE: CPT | Mod: Q9,S$GLB,, | Performed by: PODIATRIST

## 2019-05-20 PROCEDURE — 99999 PR PBB SHADOW E&M-EST. PATIENT-LVL III: ICD-10-PCS | Mod: PBBFAC,,, | Performed by: PODIATRIST

## 2019-05-20 PROCEDURE — 99999 PR PBB SHADOW E&M-EST. PATIENT-LVL III: CPT | Mod: PBBFAC,,, | Performed by: PODIATRIST

## 2019-05-20 RX ORDER — MIRABEGRON 25 MG/1
TABLET, FILM COATED, EXTENDED RELEASE ORAL
COMMUNITY
Start: 2019-05-16

## 2019-05-20 RX ORDER — MUPIROCIN 20 MG/G
OINTMENT TOPICAL
Refills: 0 | COMMUNITY
Start: 2019-03-20

## 2019-05-20 NOTE — PROGRESS NOTES
Subjective:      Patient ID: Evangelina Mallory is a 73 y.o. female.    Chief Complaint: Diabetes Mellitus (Pcp Dr. Lilly  03/2019); Diabetic Foot Exam; and Nail Care    Evangelina is a 73 y.o. female who presents to the clinic for evaluation and treatment of high risk feet. Evangelina has a past medical history of Diabetes mellitus, type 2. The patient's chief complaint is  elongated, thickened toenails aggravated by increased weight bearing, shoe gear, pressure. This patient has documented high risk feet requiring routine maintenance secondary to diabetes mellitis and those secondary complications of diabetes, as mentioned.       PCP: Ashlee Lilly MD    Date Last Seen by PCP:   Chief Complaint   Patient presents with    Diabetes Mellitus     Pcp Dr. Lilly  03/2019    Diabetic Foot Exam    Nail Care     Current shoe gear: rx shoes    No results found for: HGBA1C  Patient Active Problem List    Diagnosis Date Noted    Type II diabetes mellitus with neurological manifestations 08/02/2018     Current Outpatient Medications on File Prior to Visit   Medication Sig Dispense Refill    ACCU-CHEK DONIS CONTROL SOLN Soln       ACCU-CHEK DONIS PLUS METER Misc       amlodipine (NORVASC) 5 MG tablet Take 5 mg by mouth 2 (two) times daily.   1    BD INSULIN SYRINGE ULTRA-FINE 0.3 mL 31 gauge x 5/16 Syrg       CONTOUR NEXT STRIPS Strp   1    cyclobenzaprine (FLEXERIL) 5 MG tablet Take 5 mg by mouth nightly.       ferrous sulfate 325 mg (65 mg iron) Tab tablet TK 1 T PO  BID  3    FLUZONE HIGH-DOSE 2017-18, PF, 180 mcg/0.5 mL vaccine ADM 0.5ML IM UTD  0    gabapentin (NEURONTIN) 800 MG tablet Take 800 mg by mouth 3 (three) times daily.       glipiZIDE (GLUCOTROL) 5 MG tablet Take 5 mg by mouth 3 (three) times daily.       LEVEMIR FLEXTOUCH 100 unit/mL (3 mL) InPn pen Inject 33 Units into the skin every evening.       losartan (COZAAR) 25 MG tablet Take 25 mg by mouth once daily.   1    LYRICA 75 mg capsule Take 75 mg by mouth 2  "(two) times daily.       meclizine (ANTIVERT) 25 mg tablet   0    MICROLET LANCET Misc   1    mupirocin (BACTROBAN) 2 % ointment APPLY TO AFFECTED AREA 3 TIMES A DAY AS NEEDED UNTIL RASH RESOLVES  0    MYRBETRIQ 25 mg Tb24 ER tablet       NOVOFINE 30 30 x 1/3 " Ndle       NOVOLIN 70/30 U-100 INSULIN 100 unit/mL (70-30) injection       NOVOLOG FLEXPEN 100 unit/mL InPn pen Inject 10 Units into the skin 3 (three) times daily.       pantoprazole (PROTONIX) 40 MG tablet Take 40 mg by mouth once daily.       pravastatin (PRAVACHOL) 40 MG tablet Take 40 mg by mouth once daily.       PREVNAR 13, PF, 0.5 mL Syrg ADM 0.5ML IM UTD  0    PROAIR HFA 90 mcg/actuation inhaler Inhale 2 puffs into the lungs every 6 (six) hours as needed.       tramadol (ULTRAM) 50 mg tablet Take 50 mg by mouth every 8 (eight) hours as needed.       VITAMIN D2 50,000 unit capsule Take 50,000 Units by mouth every 7 days.  0     No current facility-administered medications on file prior to visit.      Review of patient's allergies indicates:  No Known Allergies  Past Surgical History:   Procedure Laterality Date    HYSTERECTOMY       Family History   Problem Relation Age of Onset    Hypertension Mother     Diabetes Father      Social History     Socioeconomic History    Marital status:      Spouse name: Not on file    Number of children: Not on file    Years of education: Not on file    Highest education level: Not on file   Occupational History    Not on file   Social Needs    Financial resource strain: Not on file    Food insecurity:     Worry: Not on file     Inability: Not on file    Transportation needs:     Medical: Not on file     Non-medical: Not on file   Tobacco Use    Smoking status: Former Smoker    Smokeless tobacco: Former User   Substance and Sexual Activity    Alcohol use: Yes    Drug use: No    Sexual activity: Not on file   Lifestyle    Physical activity:     Days per week: Not on file     Minutes " "per session: Not on file    Stress: Not on file   Relationships    Social connections:     Talks on phone: Not on file     Gets together: Not on file     Attends Sabianist service: Not on file     Active member of club or organization: Not on file     Attends meetings of clubs or organizations: Not on file     Relationship status: Not on file   Other Topics Concern    Not on file   Social History Narrative    Not on file     Review of Systems   Constitution: Positive for weight loss (intentional). Negative for chills and fever.   Cardiovascular: Negative for claudication and leg swelling.   Respiratory: Negative for cough and shortness of breath.    Skin: Positive for dry skin and nail changes. Negative for itching and rash.   Musculoskeletal: Positive for arthritis and muscle cramps (nocturnal). Negative for falls, joint pain, joint swelling and muscle weakness.   Gastrointestinal: Negative for diarrhea, nausea and vomiting.   Neurological: Positive for numbness, paresthesias and sensory change. Negative for tremors and weakness.   Psychiatric/Behavioral: Negative for altered mental status and hallucinations.         Objective:       Vitals:    05/20/19 1122   BP: 132/76   Weight: 108.9 kg (240 lb)   Height: 5' 7" (1.702 m)   PainSc: 0-No pain       Physical Exam   Constitutional:   General: Pt. is well-developed, well-nourished, appears stated age, in no acute distress, alert and oriented x 3. No evidence of depression, anxiety, or agitation. Calm, cooperative, and communicative. Appropriate interactions and affect.       Cardiovascular:   Pulses:       Dorsalis pedis pulses are 1+ on the right side, and 1+ on the left side.        Posterior tibial pulses are 1+ on the right side, and 1+ on the left side.   Dorsalis pedis and posterior tibial pulses are diminished Bilaterally. Toes are cool to touch. Feet are warm proximally.There is decreased digital hair. Skin is atrophic     Musculoskeletal:        Right " ankle: She exhibits normal range of motion and no swelling. No tenderness. Achilles tendon exhibits no pain and no defect.        Left ankle: She exhibits normal range of motion and no swelling. No tenderness. Achilles tendon exhibits no pain and no defect.        Right foot: There is normal range of motion and no tenderness.        Left foot: There is normal range of motion and no tenderness.   Decreased stride, station of gait.  apropulsive toe off.  Increased angle and base of gait.    Patient has hammertoes of digits 2-5 bilateral partially reducible without symptom today.    Visible and palpable bunion without pain at dorsomedial 1st metatarsal head right and left.  Hallux abducted right and left partially reducible, tracks laterally without being track bound.  No ecchymosis, erythema, edema, or cardinal signs infection or signs of trauma same foot.     Neurological: A sensory deficit is present.   [place]-Sonu 5.07 monofilamant testing is diminished Yadiel feet. Sharp/dull sensation diminished Bilaterally. Light touch diminished Bilaterally.   Skin: Skin is warm, dry and intact. No abrasion, no ecchymosis, no lesion and no rash noted. She is not diaphoretic. No cyanosis or erythema. No pallor. Nails show no clubbing.   Skin is atrophic.      Diminished pedal hair noted    Toenails 1-5 bilaterally are elongated by 2-3 mm, thickened by 2-3 mm, discolored/yellowed, dystrophic, brittle with subungual debris.      Interdigital Spaces clean, dry and without evidence of break in skin integrity     Psychiatric: She has a normal mood and affect. Her speech is normal.   Nursing note and vitals reviewed.        Assessment:       Encounter Diagnoses   Name Primary?    Type II diabetes mellitus with neurological manifestations Yes    Hallux abducto valgus, left     Hallux abducto valgus, right     Onychomycosis due to dermatophyte     Corn or callus     Hammer toes of both feet          Plan:       Evangelina was seen  today for diabetes mellitus, diabetic foot exam and nail care.    Diagnoses and all orders for this visit:    Type II diabetes mellitus with neurological manifestations  -     DIABETIC SHOES FOR HOME USE    Hallux abducto valgus, left  -     DIABETIC SHOES FOR HOME USE    Hallux abducto valgus, right  -     DIABETIC SHOES FOR HOME USE    Onychomycosis due to dermatophyte    Corn or callus  -     DIABETIC SHOES FOR HOME USE    Hammer toes of both feet  -     DIABETIC SHOES FOR HOME USE      I counseled the patient on her conditions, their implications and medical management.    - Shoe inspection. Diabetic Foot Education. Patient reminded of the importance of good nutrition and blood sugar control to help prevent podiatric complications of diabetes. Patient instructed on proper foot hygeine. We discussed wearing proper shoe gear, daily foot inspections, never walking without protective shoe gear, never putting sharp instruments to feet.     Rx diabetic shoes for protection and support    - With patient's permission, nails were aggressively reduced and debrided x 10 to their soft tissue attachment mechanically and with electric , removing all offending nail and debris. Patient relates relief following the procedure. She will continue to monitor the areas daily, inspect her feet, wear protective shoe gear when ambulatory, moisturizer to maintain skin integrity and follow in this office in approximately 3-4 months, sooner p.r.n.

## 2019-05-20 NOTE — PATIENT INSTRUCTIONS
Recommend lotions: eucerin, eucerin for diabetics, aquaphor, A&D ointment, gold bond for diabetics, sween, Edgewood's Bees all purpose baby ointment,  urea 40 with aloe (found on amazon.com)    Shoe recommendations: (try 6pm.com, zappos.com , nordstromrack.MoveinBlue, or shoes.[email or web address] for discounted prices) you can visit [employer] in [place]  or [facility] in the [facility] (there are also several shoe brand outlets in the [facility])    Asics (GT 2000 or gel foundations), new balance stability type shoes, saucony (stabil c3),  Atkinson (GTS or Beast or transcend), propet (tennis shoe)    Sofabram Kirby (women) Ilene&Travon (men), clarks, crocs, aerosoles, naturalizers, SAS, ecco, born, orlando castro, rockports (dress shoes)    Vionic, burkenstocks, fitflops, propet (sandals)  Nike comfort thong sandals, crocs, propet (house shoes)    Nail Home remedy:  Vicks Vapor rub to nails for easier manageability      Diabetes: Inspecting Your Feet  Diabetes increases your chances of developing foot problems. So inspect your feet every day. This helps you find small skin irritations before they become serious infections. If you have trouble seeing the bottoms of your feet, use a mirror or ask a family member or friend to help.     Pressure spots on the bottom of the foot are common areas where problems develop.   How to check your feet  Below are tips to help you look for foot problems. Try to check your feet at the same time each day, such as when you get out of bed in the morning:  · Check the top of each foot. The tops of toes, back of the heel, and outer edge of the foot can get a lot of rubbing from poor-fitting shoes.  · Check the bottom of each foot. Daily wear and tear often leads to problems at pressure spots.  · Check the toes and nails. Fungal infections often occur between toes. Toenail problems can also be a sign of fungal infections or lead to breaks in the skin.  · Check your shoes, too. Loose objects inside a shoe can injure the  foot. Use your hand to feel inside your shoes for things like isabela, loose stitching, or rough areas that could irritate your skin.  Warning signs  Look for any color changes in the foot. Redness with streaks can signal a severe infection, which needs immediate medical attention. Tell your doctor right away if you have any of these problems:  · Swelling, sometimes with color changes, may be a sign of poor blood flow or infection. Symptoms include tenderness and an increase in the size of your foot.  · Warm or hot areas on your feet may be signs of infection. A foot that is cold may not be getting enough blood.  · Sensations such as burning, tingling, or pins and needles can be signs of a problem. Also check for areas that may be numb.  · Hot spots are caused by friction or pressure. Look for hot spots in areas that get a lot of rubbing. Hot spots can turn into blisters, calluses, or sores.  · Cracks and sores are caused by dry or irritated skin. They are a sign that the skin is breaking down, which can lead to infection.  · Toenail problems to watch for include nails growing into the skin (ingrown toenail) and causing redness or pain. Thick, yellow, or discolored nails can signal a fungal infection.  · Drainage and odor can develop from untreated sores and ulcers. Call your doctor right away if you notice white or yellow drainage, bleeding, or unpleasant odor.   © 9211-6583 [employer]. [address] [place] [address]. All rights reserved. This information is not intended as a substitute for professional medical care. Always follow your healthcare professional's instructions.        Step-by-Step:  Inspecting Your Feet (Diabetes)    Date Last Reviewed: 10/1/2016  © 0482-9824 [employer]. [address] [place] [id number]. All rights reserved. This information is not intended as a substitute for professional medical care. Always follow your healthcare professional's  instructions.